# Patient Record
Sex: MALE | Race: WHITE | NOT HISPANIC OR LATINO | Employment: OTHER | URBAN - METROPOLITAN AREA
[De-identification: names, ages, dates, MRNs, and addresses within clinical notes are randomized per-mention and may not be internally consistent; named-entity substitution may affect disease eponyms.]

---

## 2017-01-18 PROBLEM — F41.9 ANXIETY: Status: ACTIVE | Noted: 2017-01-18

## 2019-03-06 PROBLEM — M67.911 ROTATOR CUFF DISORDER, RIGHT: Status: ACTIVE | Noted: 2019-03-06

## 2019-03-06 PROBLEM — M70.21 OLECRANON BURSITIS OF RIGHT ELBOW: Status: ACTIVE | Noted: 2019-03-06

## 2019-09-23 PROBLEM — M25.729 OLECRANON BONE SPUR: Status: ACTIVE | Noted: 2019-09-23

## 2020-03-09 PROBLEM — D21.10: Status: ACTIVE | Noted: 2020-03-09

## 2020-10-19 ENCOUNTER — PATIENT MESSAGE (OUTPATIENT)
Dept: OTHER | Facility: OTHER | Age: 65
End: 2020-10-19

## 2020-12-21 ENCOUNTER — PATIENT MESSAGE (OUTPATIENT)
Dept: OTHER | Facility: OTHER | Age: 65
End: 2020-12-21

## 2021-02-10 PROBLEM — Z78.9 STATIN INTOLERANCE: Status: ACTIVE | Noted: 2021-02-10

## 2022-03-30 ENCOUNTER — OFFICE VISIT (OUTPATIENT)
Dept: NEPHROLOGY | Facility: CLINIC | Age: 67
End: 2022-03-30
Payer: MEDICARE

## 2022-03-30 VITALS
HEART RATE: 62 BPM | HEIGHT: 70 IN | SYSTOLIC BLOOD PRESSURE: 156 MMHG | BODY MASS INDEX: 28.49 KG/M2 | WEIGHT: 199 LBS | DIASTOLIC BLOOD PRESSURE: 80 MMHG | OXYGEN SATURATION: 96 %

## 2022-03-30 DIAGNOSIS — I10 PRIMARY HYPERTENSION: Primary | ICD-10-CM

## 2022-03-30 DIAGNOSIS — D63.1 ANEMIA IN STAGE 3A CHRONIC KIDNEY DISEASE: ICD-10-CM

## 2022-03-30 DIAGNOSIS — N18.31 STAGE 3A CHRONIC KIDNEY DISEASE: ICD-10-CM

## 2022-03-30 DIAGNOSIS — N18.31 ANEMIA IN STAGE 3A CHRONIC KIDNEY DISEASE: ICD-10-CM

## 2022-03-30 PROCEDURE — 99999 PR PBB SHADOW E&M-EST. PATIENT-LVL IV: CPT | Mod: PBBFAC,,, | Performed by: INTERNAL MEDICINE

## 2022-03-30 PROCEDURE — 99999 PR PBB SHADOW E&M-EST. PATIENT-LVL IV: ICD-10-PCS | Mod: PBBFAC,,, | Performed by: INTERNAL MEDICINE

## 2022-03-30 PROCEDURE — 99214 OFFICE O/P EST MOD 30 MIN: CPT | Mod: PBBFAC,PN | Performed by: INTERNAL MEDICINE

## 2022-03-30 PROCEDURE — 99204 PR OFFICE/OUTPT VISIT, NEW, LEVL IV, 45-59 MIN: ICD-10-PCS | Mod: S$PBB,,, | Performed by: INTERNAL MEDICINE

## 2022-03-30 PROCEDURE — 99204 OFFICE O/P NEW MOD 45 MIN: CPT | Mod: S$PBB,,, | Performed by: INTERNAL MEDICINE

## 2022-03-30 RX ORDER — CHOLECALCIFEROL (VITAMIN D3) 25 MCG
1000 TABLET ORAL DAILY
COMMUNITY

## 2022-03-30 RX ORDER — ASCORBIC ACID 125 MG
TABLET,CHEWABLE ORAL
COMMUNITY

## 2022-03-30 RX ORDER — ASCORBIC ACID 500 MG
500 TABLET ORAL DAILY
COMMUNITY
End: 2023-10-09

## 2022-03-30 NOTE — PROGRESS NOTES
Subjective:       Patient ID: Ashwin Thompson is a 67 y.o. White male who presents for initial evaluation of HTN referred by PCP.     Pt states they are here for HTN. On a follow up at this PCP, his BP at that time was in the 190s. He is following with cardiology for HTN. Since initiation of tx his BP had improve. Renal US with Right kidney measures 7.7 x 4.2 x 3.9 cm. Left kidney measures 12.9 x 7.0 x 5.7 cm. and no significant hemodynamic JOSE ROBERTO on right.  Renal function per chart review with sr cr baseline of 1.1 - 1.3 mg/dL and moderately increased     - Pre- DM type 2 diagnosed in 2018 with initial HgbA1c of 5.9%. Diet controlled   - HTN diagnosed about 10 years afgo. Patient reports good adherence to the current regiment, which includes Valsartan 320 mg PO Daily, Terazosin 5 mg PO Daily, Clonidine 0.1 mg, Chlorthalidone 25 mg PO Daily. They are following low salt diet. Previously on atenolol and stopped due to bradycardia. Never hospitalized for uncontrolled HTN or hypotension/syncopal episodes.  - Uric acid stones and nephrolithiasis: on K Citrate PRN (?), no stones on renal US in 2022.  - NSAIDs: chronic diclofenac use for 10 years.    Denies fam Hx of renal disease.    Review of Systems   Constitutional: Negative for chills, fatigue and fever.   HENT: Negative for hearing loss, trouble swallowing and voice change.    Respiratory: Negative for cough, shortness of breath and wheezing.    Cardiovascular: Negative for chest pain, palpitations and leg swelling.   Gastrointestinal: Negative for abdominal distention, abdominal pain, constipation and diarrhea.   Endocrine: Negative for polydipsia, polyphagia and polyuria.   Genitourinary: Negative for dysuria, flank pain, frequency and hematuria.   Musculoskeletal: Negative for arthralgias, back pain and myalgias.   Skin: Negative for rash and wound.   Neurological: Negative for dizziness, syncope, weakness and light-headedness.   Hematological: Negative for  "adenopathy. Does not bruise/bleed easily.       The past medical, family and social histories were reviewed for this encounter.     BP (!) 156/80 (BP Location: Left arm, Patient Position: Sitting)   Pulse 62   Ht 5' 10" (1.778 m)   Wt 90.3 kg (199 lb)   SpO2 96%   BMI 28.55 kg/m²     Objective:      Physical Exam  Vitals reviewed.   Constitutional:       General: He is not in acute distress.     Appearance: He is well-developed.   HENT:      Head: Normocephalic and atraumatic.      Mouth/Throat:      Mouth: Mucous membranes are moist.      Pharynx: Oropharynx is clear.   Eyes:      General: No scleral icterus.     Extraocular Movements: Extraocular movements intact.      Conjunctiva/sclera: Conjunctivae normal.   Neck:      Vascular: No JVD.   Cardiovascular:      Rate and Rhythm: Normal rate and regular rhythm.      Heart sounds: Normal heart sounds. No murmur heard.    No friction rub. No gallop.   Pulmonary:      Effort: Pulmonary effort is normal. No respiratory distress.      Breath sounds: Normal breath sounds. No wheezing.   Abdominal:      General: Bowel sounds are normal. There is no distension.      Palpations: Abdomen is soft.      Tenderness: There is no abdominal tenderness.   Musculoskeletal:         General: No tenderness.      Cervical back: Normal range of motion.      Right lower leg: Edema present.      Left lower leg: Edema present.   Skin:     General: Skin is warm and dry.      Capillary Refill: Capillary refill takes less than 2 seconds.      Findings: No rash.   Neurological:      General: No focal deficit present.      Mental Status: He is alert and oriented to person, place, and time.   Psychiatric:         Mood and Affect: Mood normal.         Behavior: Behavior normal.         Assessment:       1. Primary hypertension    2. Stage 3a chronic kidney disease    3. Anemia in stage 3a chronic kidney disease        Plan:   Return to clinic in 6 months    CKD in a setting of HTN and " NSAIDs  Baseline creatinine is 1.1 - 1.3 mg/dL              - Last known sr cr of 1.1 mg/dL corresponding to CKD stage G3aA2 and UMACR 632   - Euvolemic   - Pt understands importance of blood pressure and glycemic control and is aware that uncontrolled HTN and DM leads to progression of CKD   - Pt will benefit from initiation of SGLT2i for cario-renal protection   - Complete avoidance of NSAIDs   - Low salt diet with < 2000 mg/day   - Dose adjust medications to GFR of 60-46   - Avoid nephrotoxins including IV contrast    Microalbuminuria in a setting of CKD    Hx of sterile pyuria    - Stop NSAIDs for 2 weeks and repeat UA    HTN   - BP uncontrolled: avoid hypotension and dehydration   - Follows up with cardiology   - Atrophic R kidney without JOSE ROBERTO    Pre-DM   - diet controlled     Vift D deficiency   - Pt wants to continue his supplement    - Tube shortage    Chronic pain    - Switch Diclofenac to Tylenol    Med changes: none

## 2022-03-30 NOTE — PATIENT INSTRUCTIONS
DO NOT take any of NSAIDs or pain medications listed below, instead use Tylenol as written on the bottle.   DO NOT take and of the following  ibuprofen.  naproxen.  diclofenac.  celecoxib.  mefenamic acid.  etoricoxib.  indomethacin.  high-dose aspirin (low-dose aspirin is not normally considered to be an NSAID).    Ok to take Tylenol    Low Salt diet < 2000 mg per day

## 2022-04-28 PROBLEM — Z78.9 STATIN INTOLERANCE: Status: RESOLVED | Noted: 2021-02-10 | Resolved: 2022-04-28

## 2022-09-27 ENCOUNTER — PATIENT MESSAGE (OUTPATIENT)
Dept: NEPHROLOGY | Facility: CLINIC | Age: 67
End: 2022-09-27
Payer: MEDICARE

## 2022-10-05 ENCOUNTER — PATIENT MESSAGE (OUTPATIENT)
Dept: NEPHROLOGY | Facility: CLINIC | Age: 67
End: 2022-10-05
Payer: MEDICARE

## 2022-10-06 ENCOUNTER — OFFICE VISIT (OUTPATIENT)
Dept: NEPHROLOGY | Facility: CLINIC | Age: 67
End: 2022-10-06
Payer: MEDICARE

## 2022-10-06 DIAGNOSIS — N18.2 CHRONIC KIDNEY DISEASE, STAGE 2 (MILD): ICD-10-CM

## 2022-10-06 DIAGNOSIS — R80.1 PERSISTENT PROTEINURIA: ICD-10-CM

## 2022-10-06 DIAGNOSIS — I10 HYPERTENSION, UNSPECIFIED TYPE: Primary | ICD-10-CM

## 2022-10-06 DIAGNOSIS — D63.1 ANEMIA IN STAGE 2 CHRONIC KIDNEY DISEASE: ICD-10-CM

## 2022-10-06 DIAGNOSIS — E79.0 ELEVATED URIC ACID IN BLOOD: ICD-10-CM

## 2022-10-06 DIAGNOSIS — N18.2 ANEMIA IN STAGE 2 CHRONIC KIDNEY DISEASE: ICD-10-CM

## 2022-10-06 DIAGNOSIS — R73.03 PRE-DIABETES: ICD-10-CM

## 2022-10-06 PROCEDURE — 99214 PR OFFICE/OUTPT VISIT, EST, LEVL IV, 30-39 MIN: ICD-10-PCS | Mod: 95,,, | Performed by: INTERNAL MEDICINE

## 2022-10-06 PROCEDURE — 99214 OFFICE O/P EST MOD 30 MIN: CPT | Mod: 95,,, | Performed by: INTERNAL MEDICINE

## 2022-10-06 NOTE — PROGRESS NOTES
Subjective:    The patient location is: [place]  The chief complaint leading to consultation is: CKD    Visit type: audiovisual    Face to Face time with patient: 10  20 minutes of total time spent on the encounter, which includes face to face time and non-face to face time preparing to see the patient (eg, review of tests), Obtaining and/or reviewing separately obtained history, Documenting clinical information in the electronic or other health record, Independently interpreting results (not separately reported) and communicating results to the patient/family/caregiver, or Care coordination (not separately reported).         Each patient to whom he or she provides medical services by telemedicine is:  (1) informed of the relationship between the physician and patient and the respective role of any other health care provider with respect to management of the patient; and (2) notified that he or she may decline to receive medical services by telemedicine and may withdraw from such care at any time.    Notes:      Patient ID: Ashwin Thompson is a 67 y.o. White male who presents for follow up on HTN and CKD.     Since last seen at nephrology clinic, Ashwin Thompson denies any new hospitalizations.  No uremic symptoms, not volume overloaded.    Reports taking their medications on time, without missed doses. As to their chronic conditions:  - CKD: Denies use of NSAIDs.   2021: baseline sr cr of 1.3 mg/dL   2022: baseline sr cr of 1.1 - 1.3 mg/dL with moderately increased proteinuria of UPCR 275. Renal US with Right kidney measures 7.7 x 4.2 x 3.9 cm. Left kidney measures 12.9 x 7.0 x 5.7 cm. and no significant hemodynamic JOSE ROBERTO on right.  - Dmt2: pt reports good glycemic control.  - HTN: uncontrolled, follows low salt diet. Per cards Valsartan decreased to 160 mg Daily due to low BP and chlorthalidone was DC-ed. Now back on Chlorthalidone. Denies dizziness/lightheadedness or hypotension/syncopal episodes.  - PVC's  symptomatic with weakness    Review of Systems   Constitutional:  Negative for appetite change, chills and fever.   HENT:  Negative for congestion.    Eyes:  Negative for visual disturbance.   Respiratory:  Negative for cough and shortness of breath.    Cardiovascular:  Positive for palpitations. Negative for chest pain and leg swelling.   Gastrointestinal:  Negative for abdominal pain, diarrhea, nausea and vomiting.   Genitourinary:  Negative for difficulty urinating, dysuria and hematuria.   Musculoskeletal:  Positive for arthralgias. Negative for myalgias.   Skin:  Negative for rash.   Neurological:  Positive for weakness. Negative for headaches.   Psychiatric/Behavioral:  Negative for sleep disturbance.      The past medical, family and social histories were reviewed for this encounter.     There were no vitals taken for this visit.    Objective:      Physical Exam  Vitals reviewed.   Constitutional:       General: He is not in acute distress.     Appearance: He is well-developed.   HENT:      Head: Normocephalic and atraumatic.   Eyes:      General: No scleral icterus.  Pulmonary:      Effort: Pulmonary effort is normal. No respiratory distress.   Neurological:      Mental Status: He is alert and oriented to person, place, and time.   Psychiatric:         Mood and Affect: Mood normal.         Behavior: Behavior normal.       Assessment:       1. Hypertension, unspecified type    2. Persistent proteinuria    3. Elevated uric acid in blood    4. Pre-diabetes    5. Chronic kidney disease, stage 2 (mild)    6. Anemia in stage 2 chronic kidney disease        Plan:   Return to clinic in 12 months    CKD stage G2A2 in a setting of NSAIDs and HTN  Baseline creatinine is 1.1 - 1.3 mg/dL and moderately increased proteinuria  Lab Results   Component Value Date    CREATININE 1.31 09/27/2022      - Euvolemic   - Pt understands importance of blood pressure and glycemic control and is aware that uncontrolled HTN and DM leads  to progression of CKD   - Complete avoidance of NSAIDs   - Low salt diet with < 2000 mg/day   - Dose adjust medications to GFR of > 60   - Avoid nephrotoxins including IV contrast    HTN   - BP controlled: continue current medications, avoid hypotension and dehydration    Proteinuria  - Due to DM     Pre-DM   - Pre-DM without diabetic retinopathy: well controlled with most recent HgbA1c of 5.6%, continue yearly optho checks    PVC's  - Follows with cardiology    Elevated uric acid    - Does not want to start Allopurinol    Anemia   - Stable will repeat on next visit    Med changes: none

## 2023-10-05 ENCOUNTER — TELEPHONE (OUTPATIENT)
Dept: NEPHROLOGY | Facility: CLINIC | Age: 68
End: 2023-10-05
Payer: MEDICARE

## 2023-10-05 NOTE — TELEPHONE ENCOUNTER
----- Message from Lucy Beckett sent at 10/5/2023  4:10 PM CDT -----  Type: Needs Medical Advice  Who Called:  pt     Best Call Back Number: [phone]    Additional Information: pt calling in regards to appt on 10/9 please advise

## 2023-10-09 ENCOUNTER — OFFICE VISIT (OUTPATIENT)
Dept: NEPHROLOGY | Facility: CLINIC | Age: 68
End: 2023-10-09
Payer: MEDICARE

## 2023-10-09 VITALS
OXYGEN SATURATION: 97 % | HEART RATE: 52 BPM | HEIGHT: 70 IN | BODY MASS INDEX: 28.88 KG/M2 | WEIGHT: 201.75 LBS | SYSTOLIC BLOOD PRESSURE: 114 MMHG | DIASTOLIC BLOOD PRESSURE: 56 MMHG

## 2023-10-09 DIAGNOSIS — N17.9 AKI (ACUTE KIDNEY INJURY): ICD-10-CM

## 2023-10-09 DIAGNOSIS — M19.90 OTHER TYPE OF OSTEOARTHRITIS, UNSPECIFIED SITE: ICD-10-CM

## 2023-10-09 DIAGNOSIS — E55.9 VITAMIN D DEFICIENCY, UNSPECIFIED: ICD-10-CM

## 2023-10-09 DIAGNOSIS — R80.1 PERSISTENT PROTEINURIA: ICD-10-CM

## 2023-10-09 DIAGNOSIS — I10 HYPERTENSION, UNSPECIFIED TYPE: Primary | ICD-10-CM

## 2023-10-09 DIAGNOSIS — N18.2 CHRONIC KIDNEY DISEASE, STAGE 2 (MILD): ICD-10-CM

## 2023-10-09 DIAGNOSIS — E79.0 ELEVATED URIC ACID IN BLOOD: ICD-10-CM

## 2023-10-09 PROCEDURE — 99214 PR OFFICE/OUTPT VISIT, EST, LEVL IV, 30-39 MIN: ICD-10-PCS | Mod: S$PBB,,, | Performed by: INTERNAL MEDICINE

## 2023-10-09 PROCEDURE — 99214 OFFICE O/P EST MOD 30 MIN: CPT | Mod: S$PBB,,, | Performed by: INTERNAL MEDICINE

## 2023-10-09 PROCEDURE — 99999 PR PBB SHADOW E&M-EST. PATIENT-LVL III: ICD-10-PCS | Mod: PBBFAC,,, | Performed by: INTERNAL MEDICINE

## 2023-10-09 PROCEDURE — 99999 PR PBB SHADOW E&M-EST. PATIENT-LVL III: CPT | Mod: PBBFAC,,, | Performed by: INTERNAL MEDICINE

## 2023-10-09 PROCEDURE — 99213 OFFICE O/P EST LOW 20 MIN: CPT | Mod: PBBFAC,PN | Performed by: INTERNAL MEDICINE

## 2023-10-09 RX ORDER — BEMPEDOIC ACID 180 MG/1
TABLET, FILM COATED ORAL
COMMUNITY
Start: 2023-04-20

## 2023-10-09 RX ORDER — NIFEDIPINE 60 MG/1
TABLET, EXTENDED RELEASE ORAL
COMMUNITY
Start: 2023-09-01 | End: 2023-10-09 | Stop reason: SDUPTHER

## 2023-10-09 RX ORDER — OLMESARTAN MEDOXOMIL 40 MG/1
1 TABLET ORAL DAILY
COMMUNITY
Start: 2023-08-03 | End: 2023-11-17 | Stop reason: SDUPTHER

## 2023-10-09 RX ORDER — NIFEDIPINE 30 MG/1
30 TABLET, FILM COATED, EXTENDED RELEASE ORAL DAILY
Qty: 30 TABLET | Refills: 11 | Status: SHIPPED | OUTPATIENT
Start: 2023-10-09 | End: 2023-10-09

## 2023-10-09 RX ORDER — NIFEDIPINE 30 MG/1
30 TABLET, FILM COATED, EXTENDED RELEASE ORAL DAILY
Qty: 90 TABLET | Refills: 3 | Status: SHIPPED | OUTPATIENT
Start: 2023-10-09 | End: 2023-10-10 | Stop reason: SDUPTHER

## 2023-10-09 RX ORDER — SPIRONOLACTONE 25 MG/1
50 TABLET ORAL DAILY
COMMUNITY
Start: 2023-04-20 | End: 2023-11-17 | Stop reason: ALTCHOICE

## 2023-10-09 NOTE — PROGRESS NOTES
Subjective:    Patient ID: Ashwin Thompson is a 68 y.o. White male who presents for follow up on HTN and CKD.     Since last seen at nephrology clinic, Ashwin Thompson denies any new hospitalizations. Starter Nexletol in April. Angiogram done on August 22nd, 2023 without JOSE ROBERTO. Now on spironolactone. Switched from Valsartan to Olmesartan on August 3rd and now with increased sr cr up to 1.9 mg/dL.  No uremic symptoms, not volume overloaded.    Reports taking their medications on time, without missed doses. As to their chronic conditions:  - CKD: Denies use of NSAIDs.   2021: baseline sr cr of 1.3 mg/dL   2022: baseline sr cr of 1.1 - 1.3 mg/dL with moderately increased proteinuria of UPCR 275. Renal US with Right kidney measures 7.7 x 4.2 x 3.9 cm. Left kidney measures 12.9 x 7.0 x 5.7 cm. and no significant hemodynamic JOSE ROBERTO on right.   2023: baseline sr cr of 1.5 - 1.9 mg/dL with moderately increased proteinuria of UPCR 244  - HTN: controlled, follows low salt diet. Denies dizziness/lightheadedness or hypotension/syncopal episodes.  - PVC's symptomatic with weakness  - High uric acid torie setting of Nexletol and did not tolerate urate-lowering drugs    Review of Systems   Constitutional:  Negative for appetite change, chills and fever.   HENT:  Negative for congestion.    Eyes:  Negative for visual disturbance.   Respiratory:  Negative for cough and shortness of breath.    Cardiovascular:  Positive for palpitations. Negative for chest pain and leg swelling.   Gastrointestinal:  Negative for abdominal pain, diarrhea, nausea and vomiting.   Genitourinary:  Negative for difficulty urinating, dysuria and hematuria.   Musculoskeletal:  Positive for arthralgias. Negative for myalgias.   Skin:  Negative for rash.   Neurological:  Negative for weakness and headaches.   Psychiatric/Behavioral:  Negative for sleep disturbance.        The past medical, family and social histories were reviewed for this encounter.     BP  "(!) 114/56 (BP Location: Left arm, Patient Position: Sitting, BP Method: Medium (Manual))   Pulse (!) 52   Ht 5' 10" (1.778 m)   Wt 91.5 kg (201 lb 11.5 oz)   SpO2 97%   BMI 28.94 kg/m²     Objective:      Physical Exam  Vitals reviewed.   Constitutional:       General: He is not in acute distress.     Appearance: He is well-developed.   HENT:      Head: Normocephalic and atraumatic.   Eyes:      General: No scleral icterus.  Pulmonary:      Effort: Pulmonary effort is normal. No respiratory distress.   Neurological:      Mental Status: He is alert and oriented to person, place, and time.   Psychiatric:         Mood and Affect: Mood normal.         Behavior: Behavior normal.         Assessment:       1. Hypertension, unspecified type    2. Persistent proteinuria    3. Chronic kidney disease, stage 2 (mild)    4. JANUSZ (acute kidney injury)    5. Other type of osteoarthritis, unspecified site    6. Elevated uric acid in blood    7. Vitamin D deficiency, unspecified        Plan:   Return to clinic in 1 months    CKD stage G2A2 in a setting of NSAIDs and HTN  Baseline creatinine is 1.1 - 1.3 mg/dL and moderately increased proteinuria and now with increased sr cr up to 1.9 mg/dL  Lab Results   Component Value Date    CREATININE 1.86 (H) 10/04/2023      - Euvolemic   - Pt understands importance of blood pressure and glycemic control and is aware that uncontrolled HTN and DM leads to progression of CKD   - Complete avoidance of NSAIDs   - Low salt diet with < 2000 mg/day   - Dose adjust medications to GFR of 30-45   - Avoid nephrotoxins including IV contrast    HTN   - BP controlled: continue current medications, avoid hypotension and dehydration    Proteinuria  - Due to DM     Pre-DM   - Pre-DM without diabetic retinopathy: well controlled with most recent HgbA1c of 5.6%, continue yearly optho checks    PVC's  - Follows with cardiology    Elevated uric acid    - Does not want to start Allopurinol    Anemia   - Stable " will repeat on next visit    Med changes:   Decrease Nifedipine to 30 mg daily

## 2023-10-10 ENCOUNTER — PATIENT MESSAGE (OUTPATIENT)
Dept: NEPHROLOGY | Facility: CLINIC | Age: 68
End: 2023-10-10
Payer: MEDICARE

## 2023-10-10 RX ORDER — NIFEDIPINE 30 MG/1
30 TABLET, FILM COATED, EXTENDED RELEASE ORAL DAILY
Qty: 90 TABLET | Refills: 3 | Status: SHIPPED | OUTPATIENT
Start: 2023-10-10 | End: 2023-11-17 | Stop reason: SDUPTHER

## 2023-10-24 ENCOUNTER — PATIENT MESSAGE (OUTPATIENT)
Dept: NEPHROLOGY | Facility: CLINIC | Age: 68
End: 2023-10-24
Payer: MEDICARE

## 2023-11-02 ENCOUNTER — PATIENT MESSAGE (OUTPATIENT)
Dept: NEPHROLOGY | Facility: CLINIC | Age: 68
End: 2023-11-02
Payer: MEDICARE

## 2023-11-02 DIAGNOSIS — N21.8 CALCULUS OF OTHER LOWER URINARY TRACT LOCATION: Primary | ICD-10-CM

## 2023-11-17 ENCOUNTER — OFFICE VISIT (OUTPATIENT)
Dept: NEPHROLOGY | Facility: CLINIC | Age: 68
End: 2023-11-17
Payer: MEDICARE

## 2023-11-17 ENCOUNTER — TELEPHONE (OUTPATIENT)
Dept: NEPHROLOGY | Facility: CLINIC | Age: 68
End: 2023-11-17

## 2023-11-17 VITALS — SYSTOLIC BLOOD PRESSURE: 124 MMHG | HEART RATE: 54 BPM | OXYGEN SATURATION: 95 % | DIASTOLIC BLOOD PRESSURE: 66 MMHG

## 2023-11-17 DIAGNOSIS — E78.2 MIXED HYPERLIPIDEMIA: ICD-10-CM

## 2023-11-17 DIAGNOSIS — I10 PRIMARY HYPERTENSION: Primary | ICD-10-CM

## 2023-11-17 DIAGNOSIS — N18.2 CHRONIC KIDNEY DISEASE, STAGE 2 (MILD): ICD-10-CM

## 2023-11-17 DIAGNOSIS — E79.0 ELEVATED URIC ACID IN BLOOD: ICD-10-CM

## 2023-11-17 DIAGNOSIS — R80.1 PERSISTENT PROTEINURIA: ICD-10-CM

## 2023-11-17 DIAGNOSIS — R73.03 PRE-DIABETES: ICD-10-CM

## 2023-11-17 PROCEDURE — 99999 PR PBB SHADOW E&M-EST. PATIENT-LVL III: ICD-10-PCS | Mod: PBBFAC,,, | Performed by: INTERNAL MEDICINE

## 2023-11-17 PROCEDURE — 99214 PR OFFICE/OUTPT VISIT, EST, LEVL IV, 30-39 MIN: ICD-10-PCS | Mod: S$PBB,,, | Performed by: INTERNAL MEDICINE

## 2023-11-17 PROCEDURE — 99213 OFFICE O/P EST LOW 20 MIN: CPT | Mod: PBBFAC,PN | Performed by: INTERNAL MEDICINE

## 2023-11-17 PROCEDURE — 99214 OFFICE O/P EST MOD 30 MIN: CPT | Mod: S$PBB,,, | Performed by: INTERNAL MEDICINE

## 2023-11-17 PROCEDURE — 99999 PR PBB SHADOW E&M-EST. PATIENT-LVL III: CPT | Mod: PBBFAC,,, | Performed by: INTERNAL MEDICINE

## 2023-11-17 RX ORDER — OLMESARTAN MEDOXOMIL 40 MG/1
40 TABLET ORAL DAILY
Qty: 90 TABLET | Refills: 3 | Status: SHIPPED | OUTPATIENT
Start: 2023-11-17 | End: 2024-11-16

## 2023-11-17 RX ORDER — POTASSIUM CITRATE 5 MEQ/1
10 TABLET, EXTENDED RELEASE ORAL
Qty: 180 TABLET | Refills: 11 | Status: SHIPPED | OUTPATIENT
Start: 2023-11-17 | End: 2024-11-16

## 2023-11-17 RX ORDER — ALLOPURINOL 100 MG/1
100 TABLET ORAL DAILY
Qty: 30 TABLET | Refills: 0 | Status: SHIPPED | OUTPATIENT
Start: 2023-11-17 | End: 2023-12-17

## 2023-11-17 RX ORDER — HYDROCHLOROTHIAZIDE 25 MG/1
25 TABLET ORAL DAILY
Qty: 30 TABLET | Refills: 11 | Status: SHIPPED | OUTPATIENT
Start: 2023-11-17 | End: 2023-11-17 | Stop reason: SDUPTHER

## 2023-11-17 RX ORDER — NIFEDIPINE 30 MG/1
30 TABLET, FILM COATED, EXTENDED RELEASE ORAL DAILY
Qty: 90 TABLET | Refills: 3 | Status: SHIPPED | OUTPATIENT
Start: 2023-11-17 | End: 2023-11-20 | Stop reason: SDUPTHER

## 2023-11-17 RX ORDER — ALLOPURINOL 100 MG/1
100 TABLET ORAL DAILY
Qty: 30 TABLET | Refills: 0 | Status: SHIPPED | OUTPATIENT
Start: 2023-11-17 | End: 2023-11-17 | Stop reason: SDUPTHER

## 2023-11-17 RX ORDER — HYDROCHLOROTHIAZIDE 25 MG/1
25 TABLET ORAL DAILY
Qty: 30 TABLET | Refills: 11 | Status: SHIPPED | OUTPATIENT
Start: 2023-11-17 | End: 2023-11-20 | Stop reason: SDUPTHER

## 2023-11-17 RX ORDER — POTASSIUM CITRATE 5 MEQ/1
10 TABLET, EXTENDED RELEASE ORAL
Qty: 180 TABLET | Refills: 11 | Status: SHIPPED | OUTPATIENT
Start: 2023-11-17 | End: 2023-11-17 | Stop reason: SDUPTHER

## 2023-11-17 NOTE — PROGRESS NOTES
Subjective:    Patient ID: Ashwin Thompson is a 68 y.o. White male who presents for follow up on HTN and CKD.     Since last seen at nephrology clinic, Ashwin Thompson denies any new hospitalizations.   No uremic symptoms, not volume overloaded.    Reports taking their medications on time, without missed doses. As to their chronic conditions:  - CKD: Denies use of NSAIDs.   2021: baseline sr cr of 1.3 mg/dL   2022: baseline sr cr of 1.1 - 1.3 mg/dL with moderately increased proteinuria of UPCR 275. Renal US with Right kidney measures 7.7 x 4.2 x 3.9 cm. Left kidney measures 12.9 x 7.0 x 5.7 cm. and no significant hemodynamic JOSE ROBERTO on right.   2023: baseline sr cr of 1.5 - 1.9 mg/dL with moderately increased proteinuria of UPCR 244  - HTN: uncontrolled, follows low salt diet. Denies dizziness/lightheadedness or hypotension/syncopal episodes.  - PVC's symptomatic with weakness  - High uric acid in a setting of Nexletol and did not tolerate urate-lowering drugs- now stopped Nexletol    Review of Systems   Constitutional:  Negative for appetite change, chills and fever.   HENT:  Negative for congestion.    Eyes:  Negative for visual disturbance.   Respiratory:  Negative for cough and shortness of breath.    Cardiovascular:  Positive for leg swelling. Negative for chest pain and palpitations.   Gastrointestinal:  Negative for abdominal pain, diarrhea, nausea and vomiting.   Genitourinary:  Negative for difficulty urinating, dysuria and hematuria.   Musculoskeletal:  Negative for arthralgias and myalgias.   Skin:  Negative for rash.   Neurological:  Negative for weakness and headaches.   Psychiatric/Behavioral:  Negative for sleep disturbance.        The past medical, family and social histories were reviewed for this encounter.     /66 (BP Location: Right arm, Patient Position: Sitting, BP Method: Large (Manual))   Pulse (!) 54   SpO2 95%     Objective:      Physical Exam  Vitals reviewed.    Constitutional:       General: He is not in acute distress.     Appearance: Normal appearance. He is well-developed. He is not ill-appearing.   HENT:      Head: Normocephalic and atraumatic.      Mouth/Throat:      Mouth: Mucous membranes are moist.      Pharynx: Oropharynx is clear.   Eyes:      General: No scleral icterus.     Extraocular Movements: Extraocular movements intact.      Conjunctiva/sclera: Conjunctivae normal.   Cardiovascular:      Rate and Rhythm: Normal rate and regular rhythm.      Pulses: Normal pulses.      Heart sounds: No murmur heard.  Pulmonary:      Effort: Pulmonary effort is normal. No respiratory distress.   Abdominal:      General: Abdomen is flat. Bowel sounds are normal. There is no distension.      Palpations: Abdomen is soft.   Musculoskeletal:         General: No swelling or tenderness.   Skin:     General: Skin is warm and dry.      Capillary Refill: Capillary refill takes less than 2 seconds.      Coloration: Skin is not pale.   Neurological:      General: No focal deficit present.      Mental Status: He is alert and oriented to person, place, and time.   Psychiatric:         Mood and Affect: Mood normal.         Behavior: Behavior normal.         Assessment:       1. Primary hypertension    2. Persistent proteinuria    3. Chronic kidney disease, stage 2 (mild)    4. Pre-diabetes    5. Elevated uric acid in blood    6. Mixed hyperlipidemia        Plan:   Return to clinic in 1 months    CKD stage G2A2 in a setting of NSAIDs and HTN  Baseline creatinine is 1.1 - 1.3 mg/dL and moderately increased proteinuria and now with increased sr cr up to 1.9 mg/dL  Lab Results   Component Value Date    CREATININE 1.35 11/14/2023      - Euvolemic   - Pt understands importance of blood pressure and glycemic control and is aware that uncontrolled HTN and DM leads to progression of CKD   - Complete avoidance of NSAIDs   - Low salt diet with < 2000 mg/day   - Dose adjust medications to GFR of  30-45   - Avoid nephrotoxins including IV contrast    HTN   - BP uncontrolled: continue current medications, avoid hypotension and dehydration   - Take nifedipine at night    - Stop Spironolactone   - Start HCTZ 25 mg Daily    Proteinuria  - Due to DM     Pre-DM   - Pre-DM without diabetic retinopathy: well controlled with most recent HgbA1c of 5.6%, continue yearly optho checks    PVC's  - Follows with cardiology    Elevated uric acid/Stones   - Restarting Allopurinol 100 mg if pt can tolerate then increase to 300 mg daily   - K-Citrate 10 meq PO TID    HLD   - Stop Nexletol    - Consider Repatha    Anemia   - Stable will repeat on next visit    Med changes:    - Take nifedipine at night    - Restarting Allopurinol 100 mg if pt can tolerate then increase to 300 mg daily   - Start K-Citrate 10 meq PO TID  4.        - Stop Spironolactone and start hydrochlorothiazide 25 mg daily

## 2023-11-20 ENCOUNTER — PATIENT MESSAGE (OUTPATIENT)
Dept: NEPHROLOGY | Facility: CLINIC | Age: 68
End: 2023-11-20
Payer: MEDICARE

## 2023-11-20 RX ORDER — HYDROCHLOROTHIAZIDE 25 MG/1
25 TABLET ORAL DAILY
Qty: 30 TABLET | Refills: 11 | Status: SHIPPED | OUTPATIENT
Start: 2023-11-20 | End: 2024-11-19

## 2023-11-20 RX ORDER — NIFEDIPINE 30 MG/1
30 TABLET, FILM COATED, EXTENDED RELEASE ORAL DAILY
Qty: 90 TABLET | Refills: 3 | Status: SHIPPED | OUTPATIENT
Start: 2023-11-20 | End: 2024-11-19

## 2023-11-28 ENCOUNTER — TELEPHONE (OUTPATIENT)
Dept: NEPHROLOGY | Facility: CLINIC | Age: 68
End: 2023-11-28
Payer: MEDICARE

## 2023-11-28 DIAGNOSIS — R80.1 PERSISTENT PROTEINURIA: ICD-10-CM

## 2023-11-28 DIAGNOSIS — N18.31 ANEMIA IN STAGE 3A CHRONIC KIDNEY DISEASE: ICD-10-CM

## 2023-11-28 DIAGNOSIS — I10 PRIMARY HYPERTENSION: Primary | ICD-10-CM

## 2023-11-28 DIAGNOSIS — D63.1 ANEMIA IN STAGE 3A CHRONIC KIDNEY DISEASE: ICD-10-CM

## 2023-12-12 ENCOUNTER — HOSPITAL ENCOUNTER (OUTPATIENT)
Dept: RADIOLOGY | Facility: HOSPITAL | Age: 68
Discharge: HOME | End: 2023-12-12
Attending: PODIATRIST
Payer: MEDICARE

## 2023-12-12 ENCOUNTER — OFFICE VISIT (OUTPATIENT)
Dept: PODIATRY | Facility: CLINIC | Age: 68
End: 2023-12-12
Payer: MEDICARE

## 2023-12-12 VITALS — WEIGHT: 201.75 LBS | BODY MASS INDEX: 28.88 KG/M2 | HEIGHT: 70 IN

## 2023-12-12 DIAGNOSIS — M21.621 TAILOR'S BUNION OF RIGHT FOOT: ICD-10-CM

## 2023-12-12 DIAGNOSIS — M20.11 HALLUX VALGUS OF RIGHT FOOT: ICD-10-CM

## 2023-12-12 DIAGNOSIS — M79.671 FOOT PAIN, RIGHT: Primary | ICD-10-CM

## 2023-12-12 DIAGNOSIS — M79.671 FOOT PAIN, RIGHT: ICD-10-CM

## 2023-12-12 PROCEDURE — 99999 PR PBB SHADOW E&M-EST. PATIENT-LVL III: CPT | Mod: PBBFAC,,, | Performed by: PODIATRIST

## 2023-12-12 PROCEDURE — 99999 PR PBB SHADOW E&M-EST. PATIENT-LVL III: ICD-10-PCS | Mod: PBBFAC,,, | Performed by: PODIATRIST

## 2023-12-12 PROCEDURE — 99204 OFFICE O/P NEW MOD 45 MIN: CPT | Mod: S$PBB,,, | Performed by: PODIATRIST

## 2023-12-12 PROCEDURE — 99213 OFFICE O/P EST LOW 20 MIN: CPT | Mod: PBBFAC,PO | Performed by: PODIATRIST

## 2023-12-12 PROCEDURE — 73630 X-RAY EXAM OF FOOT: CPT | Mod: TC,PO,RT

## 2023-12-12 PROCEDURE — 73630 X-RAY EXAM OF FOOT: CPT | Mod: 26,RT,, | Performed by: RADIOLOGY

## 2023-12-12 PROCEDURE — 99204 PR OFFICE/OUTPT VISIT, NEW, LEVL IV, 45-59 MIN: ICD-10-PCS | Mod: S$PBB,,, | Performed by: PODIATRIST

## 2023-12-12 PROCEDURE — 73630 XR FOOT COMPLETE 3 VIEW RIGHT: ICD-10-PCS | Mod: 26,RT,, | Performed by: RADIOLOGY

## 2023-12-15 NOTE — PROGRESS NOTES
Subjective:     Patient ID: Ashwin Thompson is a 68 y.o. male.    Chief Complaint: Foot Pain (Right foot hammertoe)    Ashwin is a 68 y.o. male with a past medical history of Arthritis, Hyperlipidemia, Hypertension, Hypothyroid, Kidney stone, and Thyroid disease. The patient's chief complaint consists of painful hammertoes and bunion of the Rt. Foot.  Describes deep aching pain in the foot with all weight bearing.  Rates pain currently as a 6/10.  Symptoms are partially alleviated with rest.  Denies sustaining trauma to the limb. Attempts to wear supportive shoe gear with no improvement in symptoms.  Inquires as to how this issue can be resolved.  Denies any additional pedal complaints.          Hemoglobin A1C   Date Value Ref Range Status   03/23/2022 5.6 0.0 - 5.6 % Final     Comment:     Reference Interval:  5.0 - 5.6 Normal   5.7 - 6.4 High Risk   > 6.5 Diabetic      Hgb A1c results are standardized based on the ([employer]) National   Glycohemoglobin Standardization Program.      Hemoglobin A1C levels are related to mean serum/plasma glucose   during the preceding 2-3 months.        08/12/2021 5.9 (H) 0.0 - 5.6 % Final     Comment:     Reference Interval:  5.0 - 5.6 Normal   5.7 - 6.4 High Risk   > 6.5 Diabetic      Hgb A1c results are standardized based on the ([employer]) National   Glycohemoglobin Standardization Program.      Hemoglobin A1C levels are related to mean serum/plasma glucose   during the preceding 2-3 months.        08/13/2020 6.0 (H) 0.0 - 5.6 % Final     Comment:     Reference Interval:  5.0 - 5.6 Normal   5.7 - 6.4 High Risk   > 6.5 Diabetic    Hgb A1c results are standardized based on the ([employer]) National   Glycohemoglobin Standardization Program.    Hemoglobin A1C levels are related to mean serum/plasma glucose   during the preceding 2-3 months.            Review of Systems   Constitutional: Negative for chills and fever.   Cardiovascular:  Negative for claudication.   Skin:  Negative for color  change and nail changes.   Musculoskeletal:  Negative for joint swelling, muscle cramps and muscle weakness.   Gastrointestinal:  Negative for nausea and vomiting.   Neurological:  Negative for numbness and paresthesias.   Psychiatric/Behavioral:  Negative for altered mental status.         Objective:     Physical Exam  Constitutional:       Appearance: Normal appearance. He is not ill-appearing.   Cardiovascular:      Pulses:           Dorsalis pedis pulses are 2+ on the right side and 2+ on the left side.        Posterior tibial pulses are 2+ on the right side and 2+ on the left side.      Comments: CFT is < 3 seconds bilateral.  Pedal hair growth is present bilateral.  Toes are warm to touch bilateral.    Musculoskeletal:         General: Tenderness and deformity present. No signs of injury.      Right lower leg: No edema.      Left lower leg: No edema.      Comments: Muscle strength 5/5 in all muscle groups bilateral.  No tenderness nor crepitation with ROM of foot/ankle joints bilateral.  Pain with palpation to the bursa overlying the Rt. Bunion and tailor bunion deformities.  Pain with palpation to met heads 2-5 of the Rt. Foot.  (-) Lachman sign noted.  Semi-rigid hammertoe deformities to digits 2-5 of the Rt. Foot.    Skin:     General: Skin is warm and dry.      Capillary Refill: Capillary refill takes 2 to 3 seconds.      Findings: No bruising, ecchymosis, erythema, signs of injury, laceration, lesion, petechiae or rash.      Comments: Pedal skin has normal turgor, temperature, and texture bilateral.  Toenails x 10 appear normotrophic. Examination of the skin reveals no evidence of significant maceration, rashes, open lesions, suspicious appearing nevi or other concerning lesions.       Neurological:      Mental Status: He is alert.      Sensory: No sensory deficit.      Motor: No weakness or atrophy.      Comments: Light touch is intact bilateral.             Assessment:      Encounter Diagnoses   Name  Primary?    Foot pain, right Yes    Hallux valgus of right foot     Tailor's bunion of right foot      Plan:     Ashwin was seen today for foot pain.    Diagnoses and all orders for this visit:    Foot pain, right  -     X-Ray Foot Complete Right; Future    Hallux valgus of right foot  -     X-Ray Foot Complete Right; Future    Tailor's bunion of right foot  -     X-Ray Foot Complete Right; Future      I counseled the patient on his conditions, their implications and medical management.    Orders written for an x-ray of the Rt. Foot.  Personally evaluated plain films which were positive for a bunion, tailor's bunion, and hammertoes.    Briefly discussed conservative treatment for this issue including: contrast therapy, applying voltaren, use of shoes with a wider toe box, and possibly future steroid injections.    Explained that surgical correction of the aforementioned issues is curative.  Patient is amenable to said plan.    Patient referred to my partner, Dr. Gill, for a surgical consultation.      RTC prn.    Mariusz Cha DPM

## 2024-01-02 ENCOUNTER — TELEPHONE (OUTPATIENT)
Dept: PODIATRY | Facility: CLINIC | Age: 69
End: 2024-01-02
Payer: MEDICARE

## 2024-01-02 NOTE — TELEPHONE ENCOUNTER
----- Message from Katrin Dumas, Patient Care Assistant sent at 1/2/2024  8:53 AM CST -----  Contact: self  Pt is calling to  if he can be seen today. He is already at the location. He is having severe pain in the L foot. Please call back to advise [phone]  Thanks

## 2024-01-04 ENCOUNTER — TELEPHONE (OUTPATIENT)
Dept: NEPHROLOGY | Facility: CLINIC | Age: 69
End: 2024-01-04

## 2024-01-04 ENCOUNTER — OFFICE VISIT (OUTPATIENT)
Dept: NEPHROLOGY | Facility: CLINIC | Age: 69
End: 2024-01-04
Payer: MEDICARE

## 2024-01-04 DIAGNOSIS — M10.00 IDIOPATHIC GOUT, UNSPECIFIED CHRONICITY, UNSPECIFIED SITE: ICD-10-CM

## 2024-01-04 DIAGNOSIS — R80.9 PROTEINURIA, UNSPECIFIED TYPE: ICD-10-CM

## 2024-01-04 DIAGNOSIS — R73.03 PRE-DIABETES: ICD-10-CM

## 2024-01-04 DIAGNOSIS — N18.30 STAGE 3 CHRONIC KIDNEY DISEASE, UNSPECIFIED WHETHER STAGE 3A OR 3B CKD: Primary | ICD-10-CM

## 2024-01-04 PROCEDURE — 99214 OFFICE O/P EST MOD 30 MIN: CPT | Mod: 95,,, | Performed by: INTERNAL MEDICINE

## 2024-01-04 RX ORDER — ALLOPURINOL 100 MG/1
100 TABLET ORAL DAILY
Qty: 90 TABLET | Refills: 0 | Status: SHIPPED | OUTPATIENT
Start: 2024-01-04 | End: 2024-04-03

## 2024-01-04 NOTE — PROGRESS NOTES
Subjective:    This is a virtual visit     Patient location: [place]     Patient ID: Ashwin Thompson is a 68 y.o. White male  for CKD.     Had a gout attack yesterday of left big toe. Saw his primary care doctor and was given medrol which he hasn't started yet. Did take some colchicine and helped with pain. He will start taking medrol today     Reports taking their medications on time, without missed doses. As to their chronic conditions:  CKD: no use of NSAIDs.  2021: baseline sr cr of 1.3 mg/dL  2022: baseline sr cr of 1.1 - 1.3 mg/dL with moderately increased proteinuria of UPCR 275. Renal US with Right kidney measures 7.7 x 4.2 x 3.9 cm. Left kidney measures 12.9 x 7.0 x 5.7 cm. and no significant hemodynamic JOSE ROBERTO on right.  2023: baseline sr cr of 1.5 - 1.9 mg/dL with moderately increased proteinuria of UPCR 244  HTN:controlled mostly 130s at home ,follows low salt diet. Denies dizziness/lightheadedness or hypotension/syncopal episodes.  High uric acid in a setting of Nexletol and did not tolerate urate-lowering drugs- now stopped Nexletol    Review of Systems   Constitutional:  Negative for appetite change, chills and fever.   HENT:  Negative for congestion.    Eyes:  Negative for visual disturbance.   Respiratory:  Negative for cough and shortness of breath.    Cardiovascular:  Negative for chest pain and palpitations.   Gastrointestinal:  Negative for abdominal pain, diarrhea, nausea and vomiting.   Genitourinary:  Negative for difficulty urinating, dysuria and hematuria.   Musculoskeletal:         Left toe pain    Skin:  Negative for rash.   Neurological:  Negative for weakness and headaches.   Psychiatric/Behavioral:  Negative for sleep disturbance.        The past medical, family and social histories were reviewed for this encounter.     There were no vitals taken for this visit.      Assessment:       1. Stage 3 chronic kidney disease, unspecified whether stage 3a or 3b CKD    2. Idiopathic gout,  unspecified chronicity, unspecified site        Plan:   CKD stage 3 in a setting of NSAIDs and HTN  Baseline cr 1.4-1.6   Lab Results   Component Value Date    CREATININE 1.49 (H) 12/20/2023     Pt understands importance of blood pressure and glycemic control and is aware that uncontrolled HTN and DM leads to progression of CKD  Complete avoidance of NSAIDs  Low salt diet with < 2000 mg/day  Dose adjust medications to GFR of 30-45  Avoid nephrotoxins including IV contrast  Mild proteinuria UPCR 196 mg improved from previous on benicar     HTN  BP controlled: continue current medications, avoid hypotension and dehydration  Procardia 30 mg qday   on HCTZ 25 mg Daily  C/w benicar     Pre-DM  Pre-DM without diabetic retinopathy: well controlled with most recent HgbA1c of 5.6%,       Elevated uric acid/gout/Stones  Gout attack left big toe on 1/3 being managed by PCP with medrol   Non obstructing stone 17mm on CT scan chest   Following with urology outside [facility].   C/w  Allopurinol 100 mg if pt can tolerate then increase to 300 mg daily  C/w K-Citrate 10 meq PO TID  Rheumatology referral ordered   Encouraged water intake and low salt diet     Follow up in 3 months

## 2024-01-08 PROBLEM — N17.9 AKI (ACUTE KIDNEY INJURY): Status: RESOLVED | Noted: 2023-10-09 | Resolved: 2024-01-08

## 2024-01-17 ENCOUNTER — OFFICE VISIT (OUTPATIENT)
Dept: PODIATRY | Facility: CLINIC | Age: 69
End: 2024-01-17
Payer: MEDICARE

## 2024-01-17 VITALS — HEIGHT: 70 IN | BODY MASS INDEX: 28.77 KG/M2 | WEIGHT: 201 LBS

## 2024-01-17 DIAGNOSIS — Q66.221 METATARSUS ADDUCTUS OF BOTH FEET: ICD-10-CM

## 2024-01-17 DIAGNOSIS — M19.079 ARTHRITIS OF MIDFOOT: ICD-10-CM

## 2024-01-17 DIAGNOSIS — M20.11 HALLUX VALGUS OF RIGHT FOOT: Primary | ICD-10-CM

## 2024-01-17 DIAGNOSIS — Q66.222 METATARSUS ADDUCTUS OF BOTH FEET: ICD-10-CM

## 2024-01-17 DIAGNOSIS — M21.621 TAILOR'S BUNION OF RIGHT FOOT: ICD-10-CM

## 2024-01-17 PROCEDURE — 99213 OFFICE O/P EST LOW 20 MIN: CPT | Mod: PBBFAC,PO | Performed by: PODIATRIST

## 2024-01-17 PROCEDURE — 99214 OFFICE O/P EST MOD 30 MIN: CPT | Mod: S$PBB,,, | Performed by: PODIATRIST

## 2024-01-17 PROCEDURE — 99999 PR PBB SHADOW E&M-EST. PATIENT-LVL III: CPT | Mod: PBBFAC,,, | Performed by: PODIATRIST

## 2024-01-17 RX ORDER — METHYLPREDNISOLONE 4 MG/1
TABLET ORAL
COMMUNITY
Start: 2024-01-03 | End: 2024-03-12

## 2024-01-17 RX ORDER — COLCHICINE 0.6 MG/1
TABLET ORAL
COMMUNITY
Start: 2024-01-03

## 2024-01-17 NOTE — PROGRESS NOTES
Subjective:     Patient ID: Ashwin Thompson is a 68 y.o. male.    Chief Complaint: Foot Pain (R>L)    Ashwin is a 68 y.o. male with a past medical history of Arthritis, Hyperlipidemia, Hypertension, Hypothyroid, Kidney stone, and Thyroid disease. The patient's chief complaint consists of painful hammertoes and bunion of the Rt. Foot.  Describes deep aching pain in the foot with all weight bearing.  Rates pain currently as a 6/10.  Symptoms are partially alleviated with rest.  Denies sustaining trauma to the limb. Attempts to wear supportive shoe gear with no improvement in symptoms.  Inquires as to how this issue can be resolved.  Denies any additional pedal complaints.      1/17/24: patient was seen as a referral from Dr. Cha for right foot pain needing possible surgical intervention for his bunion and midfoot pain. Since then the patient has started having left foot severe pain and swelling around the [place] holidays. He was seen by PCP and diagnosed with a gout flare it has calmed with colchicine but still has an occasional pain that starts up.         Hemoglobin A1C   Date Value Ref Range Status   03/23/2022 5.6 0.0 - 5.6 % Final     Comment:     Reference Interval:  5.0 - 5.6 Normal   5.7 - 6.4 High Risk   > 6.5 Diabetic      Hgb A1c results are standardized based on the (NGSP) National   Glycohemoglobin Standardization Program.      Hemoglobin A1C levels are related to mean serum/plasma glucose   during the preceding 2-3 months.        08/12/2021 5.9 (H) 0.0 - 5.6 % Final     Comment:     Reference Interval:  5.0 - 5.6 Normal   5.7 - 6.4 High Risk   > 6.5 Diabetic      Hgb A1c results are standardized based on the (NGSP) National   Glycohemoglobin Standardization Program.      Hemoglobin A1C levels are related to mean serum/plasma glucose   during the preceding 2-3 months.        08/13/2020 6.0 (H) 0.0 - 5.6 % Final     Comment:     Reference Interval:  5.0 - 5.6 Normal   5.7 - 6.4 High Risk   >  6.5 Diabetic    Hgb A1c results are standardized based on the (NGSP) National   Glycohemoglobin Standardization Program.    Hemoglobin A1C levels are related to mean serum/plasma glucose   during the preceding 2-3 months.            Review of Systems   Constitutional: Negative for chills and fever.   Cardiovascular:  Negative for claudication.   Skin:  Negative for color change and nail changes.   Musculoskeletal:  Negative for joint swelling, muscle cramps and muscle weakness.   Gastrointestinal:  Negative for nausea and vomiting.   Neurological:  Negative for numbness and paresthesias.   Psychiatric/Behavioral:  Negative for altered mental status.         Objective:     Physical Exam  Constitutional:       Appearance: Normal appearance. He is not ill-appearing.   Cardiovascular:      Pulses:           Dorsalis pedis pulses are 2+ on the right side and 2+ on the left side.        Posterior tibial pulses are 2+ on the right side and 2+ on the left side.      Comments: CFT is < 3 seconds bilateral.  Pedal hair growth is present bilateral.  Toes are warm to touch bilateral.    Musculoskeletal:         General: Tenderness and deformity present. No signs of injury.      Right lower leg: No edema.      Left lower leg: No edema.      Comments: Muscle strength 5/5 in all muscle groups bilateral.  No tenderness nor crepitation with ROM of foot/ankle joints bilateral.      Pain with palpation to the bursa overlying the Rt. Bunion and tailor bunion deformities.  Pain with palpation to met heads 2-5 of the Rt. Foot.  (-) Lachman sign noted.  Semi-rigid hammertoe deformities to digits 2-5 of the Rt. Foot.     Left foot bunion deformity noted with the foot noting edema and mild discoloration to the foot, no pain today with palpation.    Skin:     General: Skin is warm and dry.      Capillary Refill: Capillary refill takes 2 to 3 seconds.      Findings: No bruising, ecchymosis, erythema, signs of injury, laceration, lesion,  petechiae or rash.      Comments: Pedal skin has normal turgor, temperature, and texture bilateral.  Toenails x 10 appear normotrophic. Examination of the skin reveals no evidence of significant maceration, rashes, open lesions, suspicious appearing nevi or other concerning lesions.       Neurological:      Mental Status: He is alert.      Sensory: No sensory deficit.      Motor: No weakness or atrophy.      Comments: Light touch is intact bilateral.             Assessment:      Encounter Diagnoses   Name Primary?    Hallux valgus of right foot Yes    Tailor's bunion of right foot     Metatarsus adductus of both feet     Arthritis of midfoot        Plan:     Ashwin was seen today for foot pain.    Diagnoses and all orders for this visit:    Hallux valgus of right foot  -     ORTHOTIC DEVICE (DME)    Tailor's bunion of right foot  -     ORTHOTIC DEVICE (DME)    Metatarsus adductus of both feet  -     ORTHOTIC DEVICE (DME)    Arthritis of midfoot  -     ORTHOTIC DEVICE (DME)        I counseled the patient on his conditions, their implications and medical management.    Left foot: Gout flares are being controlled with colchicine as needed and he has started allopurinol to bring the uric acid levels down, if there is another flare I offered him to come in for steroid injections in the foot as needed. Drink plenty of water, use warm water soaks or heating pads as needed. Avoid purine containing foods, shellfish processed meats, beer, etc.    Right foot: X-rays were reviewed, and there is noted severe NC joint arthritis with mid arch collapse, bunion formation is noted but with forefoot metatarsus adductus also at the 2-3 metatarsals. Surgery would need to include a metatarsus adductus correction with 1-3 tarsometatarsal fusion as well as an NC joint fusion. He would be 6 weeks non weight bearing followed by 4-6 more weeks weight bearing in a tall orthopedic boot before getting back to tennis shoes, and will need PT at  that point to get back to normal strength and activity. In the mean time I recommend custom orthotic inserts, prescription dispensed and a list of places where he can go to get them made.    Will return in 2 months to follow up how he is doing with the custom orthotic inserts and voltaren gel as needed, we can consider surgery if he is still in intense pain despite the custom inserts.    Marshall Gill DPM

## 2024-03-12 ENCOUNTER — TELEPHONE (OUTPATIENT)
Dept: HEMATOLOGY/ONCOLOGY | Facility: CLINIC | Age: 69
End: 2024-03-12
Payer: MEDICARE

## 2024-03-12 ENCOUNTER — OFFICE VISIT (OUTPATIENT)
Dept: OTOLARYNGOLOGY | Facility: CLINIC | Age: 69
End: 2024-03-12
Payer: MEDICARE

## 2024-03-12 VITALS — WEIGHT: 207.25 LBS | BODY MASS INDEX: 29.67 KG/M2 | HEIGHT: 70 IN

## 2024-03-12 DIAGNOSIS — K13.79 MASS OF ORAL CAVITY: Primary | ICD-10-CM

## 2024-03-12 DIAGNOSIS — Z87.891 PAST HISTORY OF CHEWING TOBACCO USE: ICD-10-CM

## 2024-03-12 PROCEDURE — 88307 TISSUE EXAM BY PATHOLOGIST: CPT | Mod: PO | Performed by: PATHOLOGY

## 2024-03-12 PROCEDURE — 88342 IMHCHEM/IMCYTCHM 1ST ANTB: CPT | Mod: PO | Performed by: PATHOLOGY

## 2024-03-12 PROCEDURE — 88342 IMHCHEM/IMCYTCHM 1ST ANTB: CPT | Mod: 26,,, | Performed by: PATHOLOGY

## 2024-03-12 PROCEDURE — 99213 OFFICE O/P EST LOW 20 MIN: CPT | Mod: 25,PBBFAC,PO | Performed by: STUDENT IN AN ORGANIZED HEALTH CARE EDUCATION/TRAINING PROGRAM

## 2024-03-12 PROCEDURE — 99204 OFFICE O/P NEW MOD 45 MIN: CPT | Mod: 25,S$PBB,, | Performed by: STUDENT IN AN ORGANIZED HEALTH CARE EDUCATION/TRAINING PROGRAM

## 2024-03-12 PROCEDURE — 88307 TISSUE EXAM BY PATHOLOGIST: CPT | Mod: 26,,, | Performed by: PATHOLOGY

## 2024-03-12 PROCEDURE — 99999 PR PBB SHADOW E&M-EST. PATIENT-LVL III: CPT | Mod: PBBFAC,,, | Performed by: STUDENT IN AN ORGANIZED HEALTH CARE EDUCATION/TRAINING PROGRAM

## 2024-03-12 PROCEDURE — 40810 EXCISION OF MOUTH LESION: CPT | Mod: PBBFAC,PO | Performed by: STUDENT IN AN ORGANIZED HEALTH CARE EDUCATION/TRAINING PROGRAM

## 2024-03-12 PROCEDURE — 40810 EXCISION OF MOUTH LESION: CPT | Mod: S$PBB,,, | Performed by: STUDENT IN AN ORGANIZED HEALTH CARE EDUCATION/TRAINING PROGRAM

## 2024-03-12 RX ORDER — CEPHALEXIN 500 MG/1
500 CAPSULE ORAL 4 TIMES DAILY
COMMUNITY
End: 2024-04-15

## 2024-03-12 NOTE — PATIENT INSTRUCTIONS
Oral Lichen Planus    Lichen planus is a chronic condition that causes red, purple or white spots on the skin that can sometimes itch or hurt. Oral lichen planus is a chronic condition that causes redness or white lace-like patterns in the mouth that will wax and wane over time. It can be painful and make it hard to eat. We do not yet know what causes lichen planus and there is no cure. The primary goal of treatment is to alleviate symptoms. Asymptomatic oral lichen planus does not require treatment. Local therapies are preferred over systemic therapies. Topical steroids are the drug of choice for local treatment. Systemic therapy is primarily reserved for patients experiencing involvement of other sites, such as ocular (ophthalmologist), laryngeal, esophageal (gastroenterologist), vulvovaginal (gynecologist).     We recommend minimizing exposure to factors that exacerbate oral lichen planus. We encourage the following:  Maintenance of good oral hygiene (brush teeth twice daily with a soft bristled toothbrush and a bland toothpaste, flossing daily, professional dental cleanings every 4-6 months)  Elimination of mechanical irritation from dental restorations, dental appliances, sharp cracked or broken teeth, misaligned bite  Avoidance of habits that cause trauma (chewing on lips or cheeks)  Cessation of smoking or smokeless tobacco  Reduced consumption of acidic, salty, spicy, hot, sharp, or rough foods    First-line drug therapy:  Topical oral steroid   Gel or paste:  Clobetasol, Fluocinonide, Betamethasone, Triamcinolone.  Apply 3-4 times per day with a cotton-tipped applicator.  Avoid eating or drinking for 30 minutes after each application.  As symptoms improve, the frequency of application should decrease.   Elixir or suspensions:  Dexamethasone. Used as a mouth rinse 4-6 times per day for patients with more widespread involvement.     Second-line drug therapy:  Medicines called calcineurin inhibitors:   Pimecrolimus, Tacrolimus  Medicines that numb for pain    Side effects: Oral yeast infections have been known to occur with steroid use in the mouth. In these cases, an antifungal is used.

## 2024-03-12 NOTE — NURSING
Received msg from [facility] staff of referral for patient to see Dr. Anneliese Dodson. Reviewed appt info and location with him and he verbalized understanding. Pathology request has been expedited by Dr. Bolton      Oncology Navigation   Intake  Cancer Type: Head and Neck  Type of Referral: Internal  Date of Referral: 03/12/24  Initial Nurse Navigator Contact: 03/12/24  Referral to Initial Contact Timeline (days): 0  Date Worked: 03/12/24  First Appointment Available: 03/14/24  Appointment Date: 03/14/24  Schedule to Appointment Timeline (days): 2  First Available Date vs. Scheduled Date (days): 0     Treatment  Current Status: Staging work-up    Surgical Oncologist: Dr. Anneliese Dodson ([facility] surg onc)  Consult Date: 03/14/24          Procedures: Biopsy  Biopsy Schedule Date: 03/12/24                 Acuity      Follow Up  No follow-ups on file.

## 2024-03-12 NOTE — PROGRESS NOTES
Otolaryngology Clinic Note    Subjective:       Patient ID: Ashwin Thompson is a 69 y.o. male.    Chief Complaint: white spot on gums      History of Present Illness: Ashwin Thompson is a 69 y.o. male presenting with mouth lesion    He fatimah to his dentist for routine cleaning in August and noticed he had some white patches in his mouth that he wanted he referred to ENT. He saw Dr. Peterson and had a biopsy performed in August which showed squamous mucosa with hyperplasia with basal atypia. He then saw Dr. Tyson in [place] and was told it was Lichen Planus. Its on the right lower gum line. He is having pain that started last week. He states his mouth has always been sensitive to spicy food. He is using perioxal which helps some.     It flares up and last for a few days then gets better. Past 2 weeks has grown and more sensitive. Was just on the cheek mucosa, now onto the gums. No weight loss. No neck masses. No ear pain.     His HgA1c was 6 on last lab. He was on steroids in January for his gout but not sure if it helped his mouth. He has having a gout flare-up at the time.      He has been diagnosed with Lichen Plantus was being managed by Dr. Peterson at [facility], biopsy performed in September. He had a follow up in October and it was recommended that he proceed with CO2 laser ablation under local. He did not proceed with the procedure because he was not sure why he wanted the procedure.     His bother just  last Saturday.     He denies any tobacco use, he did use dip in the past but none in the last 2 years. He used dip x30 years. No cigarettes.      Past Surgical History:   Procedure Laterality Date    GANGLION CYST EXCISION  2020    TONSILLECTOMY       Past Medical History:   Diagnosis Date    Arthritis     Hyperlipidemia     Hypertension     Hypothyroid     Kidney stone     Thyroid disease      Social Determinants of Health     Tobacco Use: Medium Risk (3/12/2024)    Patient History      Smoking Tobacco Use: Never     Smokeless Tobacco Use: Former     Passive Exposure: Not on file   Alcohol Use: Not At Risk (3/11/2024)    AUDIT-C     Frequency of Alcohol Consumption: Never     Average Number of Drinks: Patient does not drink     Frequency of Binge Drinking: Never   Financial Resource Strain: Low Risk  (3/11/2024)    Overall Financial Resource Strain (CARDIA)     Difficulty of Paying Living Expenses: Not hard at all   Food Insecurity: No Food Insecurity (3/11/2024)    Hunger Vital Sign     Worried About Running Out of Food in the Last Year: Never true     Ran Out of Food in the Last Year: Never true   Transportation Needs: No Transportation Needs (3/11/2024)    PRAPARE - Transportation     Lack of Transportation (Medical): No     Lack of Transportation (Non-Medical): No   Physical Activity: Sufficiently Active (3/11/2024)    Exercise Vital Sign     Days of Exercise per Week: 3 days     Minutes of Exercise per Session: 150+ min   Stress: No Stress Concern Present (3/11/2024)    [place] [employer] - Occupational Stress Questionnaire     Feeling of Stress : Only a little   Social Connections: Unknown (3/11/2024)    Social Connection and Isolation Panel [NHANES]     Frequency of Communication with Friends and Family: More than three times a week     Frequency of Social Gatherings with Friends and Family: Twice a week     Attends Sabianist Services: Not on file     Active Member of Clubs or Organizations: Yes     Attends Club or Organization Meetings: More than 4 times per year     Marital Status:    Housing Stability: Low Risk  (3/11/2024)    Housing Stability Vital Sign     Unable to Pay for Housing in the Last Year: No     Number of Places Lived in the Last Year: 1     Unstable Housing in the Last Year: No   Depression: Low Risk  (1/27/2023)    Depression     Last PHQ-4: Flowsheet Data: 0     Review of patient's allergies indicates:   Allergen Reactions    Felodipine  Swelling    Blueberry Diarrhea    Zocor [simvastatin]      Bone and joint pain     Current Outpatient Medications   Medication Instructions    acebutoloL (SECTRAL) 200 MG capsule No dose, route, or frequency recorded.    allopurinoL (ZYLOPRIM) 100 mg, Oral, Daily    aspirin (ECOTRIN) 81 mg, Oral, Daily    bempedoic acid (NEXLETOL) 180 mg Tab Take 1 tablet every day by oral route for 30 days.    cephALEXin (KEFLEX) 500 mg, Oral, 4 times daily    colchicine (COLCRYS) 0.6 mg tablet Oral    flaxseed oiL Oil Misc.(Non-Drug; Combo Route)    hydroCHLOROthiazide (HYDRODIURIL) 25 mg, Oral, Daily    levothyroxine (SYNTHROID) 75 mcg, Oral, Daily    MAGNESIUM CITRATE ORAL 1 capsule, Oral, 2 times daily    NIFEdipine (ADALAT CC) 30 mg, Oral, Daily    olmesartan (BENICAR) 40 mg, Oral, Daily    potassium citrate (UROCIT-K) 5 mEq (540 mg) TbSR 10 mEq, Oral, 3 times daily with meals    terazosin (HYTRIN) 5 mg, Oral, Nightly    vitamin D (VITAMIN D3) 1,000 Units, Oral, Daily    vitamin K2 100 mcg Cap Oral         ENT ROS negative except as stated above.     Patient answers are not available for this visit.            Objective:      There were no vitals filed for this visit.    General: NAD, well appearing  Eyes: Normal conjunctiva and lids  Face: symmetric, nerve intact  Nose: The nose is without any evidence of any deformity. The nasal mucosa is moist. The septum is midline. There is no evidence of septal hematoma. The turbinates are without abnormality.   Ears: The ears are with normal-appearing pinna. Examination of the canals is normal appearing bilaterally. There is no drainage or erythema noted. The tympanic membranes are normal appearing with pearly color, normal-appearing landmarks and normal light reflex. Hearing is grossly intact.  Mouth: No obvious abnormalities to the lips. The teeth are unremarkable. Theres is leukoplakic mass over right gingiva and buccal mucosa, the alveolar portion is thick, ulcerated/granular,  concerning for malignancy.   Oropharynx: The uvula is midline.  The tongue is midline. The posterior pharynx is without erythema or exudate. The tonsils are normal appearing.  Salivary glands: The salivary glands are symmetric and not enlarged, no masses  Neck: No lymphadenopathy, trachea midline, thryoid not enlarged.  Psych: Normal mood and affect.   Neuro: Grossly intact  Speech: fluent              PROCEDURE:   Written consent obtained. Time out performed.     Biopsy/Excision of right oral mass    Indication: mass of right oral cavity    Findings: 4 cm x 2 cm white, granular mass along gingiva and gingivobuccal fold on right    Blood loss: < 1 ml    Specimen: right oral cavity mass    Anesthesia: 1% 1:100,000 lidocaine with epinephrine.     Procedure: local injected. 5 mm punch biopsy used at anterior mass. Curved scissors used to dissect specimen along base.  Silver nitrate used for cautery.  Specimen sent for pathology.    Patient tolerated procedure well.       Assessment and Plan:       1. Mass of oral cavity    2. Past history of chewing tobacco use          Biopsy today from anterior thicker portion. Reviewed that I am concerned that this has progressed to malignancy since last seen. Could be severe dysplasia only but either way will need surgery.      I will have him follow up with Dr. Dodson.   I will wait on pathology results prior to ordering Cts.     Soft diet, avoid right side next week. OTC pain meds. Contact if not controlling.     RTC: with Dr. Dodson.     Plan of care was discussed in detail with the patient, who agreed with the plan as above. All questions were answered in detail.     Azul Bolton MD  Otolaryngology

## 2024-03-14 ENCOUNTER — OFFICE VISIT (OUTPATIENT)
Dept: HEMATOLOGY/ONCOLOGY | Facility: CLINIC | Age: 69
End: 2024-03-14
Payer: MEDICARE

## 2024-03-14 ENCOUNTER — TELEPHONE (OUTPATIENT)
Dept: HEMATOLOGY/ONCOLOGY | Facility: CLINIC | Age: 69
End: 2024-03-14
Payer: MEDICARE

## 2024-03-14 VITALS
DIASTOLIC BLOOD PRESSURE: 81 MMHG | WEIGHT: 209.88 LBS | SYSTOLIC BLOOD PRESSURE: 159 MMHG | RESPIRATION RATE: 16 BRPM | HEIGHT: 70 IN | BODY MASS INDEX: 30.05 KG/M2 | HEART RATE: 60 BPM | TEMPERATURE: 98 F | OXYGEN SATURATION: 97 %

## 2024-03-14 DIAGNOSIS — C06.9 CANCER OF ORAL CAVITY: ICD-10-CM

## 2024-03-14 DIAGNOSIS — K13.79 MASS OF ORAL CAVITY: Primary | ICD-10-CM

## 2024-03-14 PROBLEM — C14.8: Status: ACTIVE | Noted: 2024-03-14

## 2024-03-14 PROCEDURE — 99215 OFFICE O/P EST HI 40 MIN: CPT | Mod: 24,PBBFAC,PN | Performed by: STUDENT IN AN ORGANIZED HEALTH CARE EDUCATION/TRAINING PROGRAM

## 2024-03-14 PROCEDURE — 99999 PR PBB SHADOW E&M-EST. PATIENT-LVL V: CPT | Mod: PBBFAC,,, | Performed by: STUDENT IN AN ORGANIZED HEALTH CARE EDUCATION/TRAINING PROGRAM

## 2024-03-14 PROCEDURE — 99215 OFFICE O/P EST HI 40 MIN: CPT | Mod: 24,S$PBB,, | Performed by: STUDENT IN AN ORGANIZED HEALTH CARE EDUCATION/TRAINING PROGRAM

## 2024-03-14 RX ORDER — SODIUM CHLORIDE 0.9 % (FLUSH) 0.9 %
2 SYRINGE (ML) INJECTION EVERY 6 HOURS PRN
Status: CANCELLED | OUTPATIENT
Start: 2024-03-14

## 2024-03-14 RX ORDER — HYDROMORPHONE HYDROCHLORIDE 2 MG/1
TABLET ORAL
COMMUNITY
End: 2024-04-15

## 2024-03-14 RX ORDER — LIDOCAINE HYDROCHLORIDE 10 MG/ML
1 INJECTION, SOLUTION EPIDURAL; INFILTRATION; INTRACAUDAL; PERINEURAL ONCE
Status: CANCELLED | OUTPATIENT
Start: 2024-03-14 | End: 2024-03-14

## 2024-03-14 RX ORDER — LORAZEPAM 1 MG/1
TABLET ORAL
COMMUNITY
End: 2024-04-15

## 2024-03-14 NOTE — PROGRESS NOTES
Note to patients: In accordance with the  Cent[employer] Cures Act, patients are now granted immediate electronic access to their medical records. This note is primarily intended for communication among medical professionals. As a result, it may incorporate medical terminology, abbreviations, or language that could appear blunt or unfamiliar. If you have questions about this document, we encourage you to discuss it with your physician.      [facility]  [facility]    Patient: Ashwin Thompson    : 1955    MRN: [id number]  Primary Care Provider: Emilee Mcintosh NP  Referring Provider: Azul Bolton MD   Date of Encounter: 2024    DIAGNOSIS: pending  Cancer Staging   No matching staging information was found for the patient.    CC:   Chief Complaint   Patient presents with    cancer       HPI:   Ashwin Thompson is a 69 y.o. 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 He went to his dentist for routine cleaning in August and noticed he had some white plaques in his mouth that he wanted he referred to ENT. At that time there seemed to be several in multiple locations. He saw Dr. Peterson at [facility] and had a biopsy performed in August which showed squamous mucosa with hyperplasia with basal atypia. He was recommended to have the area ablated with CO2 laser. He was given the option of having the procedure in clinic vs the OR. He didn't feel that he understood completely the reason for the procedure or how to choose anesthetic so he went for a second opinion. He then saw Dr. Tyson (ENT) in [place] and was told it was leukoplakia, but he didn't have the proper equipment for laser ablation.     The lesion is on the right lower gum line. He is having pain that started last week. He states his mouth has always been sensitive to spicy food. He is using perioxal which helps some. He reports that it flares up and last for a few days then gets  better, but for the past 2 weeks has grown and more sensitive. Was just on the cheek mucosa, now onto the gums.     Patient denies pain, fever, chills, night sweats, unintentional weight loss, neck mass, enlarged lymph nodes outside of the head or neck, odynophagia, dysphagia, globus sensation, voice changes, cough, hemoptysis, shortness of breath, or new or concerning skin lesions.     Hx of gout and cholesterol problems - going to start immunotherapy for cholesterol soon.    TREATMENT HISTORY:  N/A    SUBSTANCE USE:  Smoking: Hx of smokeless tobacco - quit 2 years ago  Denies cigarettes, hookah, marijuana, betel nut, illicit drug, heavy cigar, vape use.    ALLERGIES:  Review of patient's allergies indicates:   Allergen Reactions    Felodipine Swelling    Blueberry Diarrhea    Zocor [simvastatin]      Bone and joint pain         MEDICATIONS:    Current Outpatient Medications:     acebutoloL (SECTRAL) 200 MG capsule, , Disp: , Rfl:     allopurinoL (ZYLOPRIM) 100 MG tablet, Take 1 tablet (100 mg total) by mouth once daily., Disp: 90 tablet, Rfl: 0    aspirin (ECOTRIN) 81 MG EC tablet, Take 81 mg by mouth once daily., Disp: , Rfl:     cephALEXin (KEFLEX) 500 MG capsule, Take 500 mg by mouth 4 (four) times daily., Disp: , Rfl:     hydroCHLOROthiazide (HYDRODIURIL) 25 MG tablet, Take 1 tablet (25 mg total) by mouth once daily., Disp: 30 tablet, Rfl: 11    levothyroxine (SYNTHROID) 75 MCG tablet, Take 1 tablet (75 mcg total) by mouth once daily., Disp: 90 tablet, Rfl: 1    NIFEdipine (ADALAT CC) 30 MG TbSR, Take 1 tablet (30 mg total) by mouth once daily., Disp: 90 tablet, Rfl: 3    olmesartan (BENICAR) 40 MG tablet, Take 1 tablet (40 mg total) by mouth once daily., Disp: 90 tablet, Rfl: 3    potassium citrate (UROCIT-K) 5 mEq (540 mg) TbSR, Take 2 tablets (10 mEq total) by mouth 3 (three) times daily with meals., Disp: 180 tablet, Rfl: 11    terazosin (HYTRIN) 5 MG capsule, Take 1 capsule (5 mg total) by mouth every  evening., Disp: 30 capsule, Rfl: 11    vitamin D (VITAMIN D3) 1000 units Tab, Take 1,000 Units by mouth once daily., Disp: , Rfl:     vitamin K2 100 mcg Cap, Take by mouth., Disp: , Rfl:     bempedoic acid (NEXLETOL) 180 mg Tab, Take 1 tablet every day by oral route for 30 days., Disp: , Rfl:     colchicine (COLCRYS) 0.6 mg tablet, Take by mouth., Disp: , Rfl:     flaxseed oiL Oil, by Misc.(Non-Drug; Combo Route) route., Disp: , Rfl:     HYDROmorphone (DILAUDID) 2 MG tablet, Take 1 tablet every 6 hours by oral route as needed for 5 days., Disp: , Rfl:     LORazepam (ATIVAN) 1 MG tablet, Take 1 tablet as needed by oral route as directed., Disp: , Rfl:     MAGNESIUM CITRATE ORAL, Take 1 capsule by mouth 2 (two) times a day., Disp: , Rfl:     PAST MEDICAL HISTORY:  Past Medical History:   Diagnosis Date    Arthritis     Hyperlipidemia     Hypertension     Hypothyroid     Kidney stone     Thyroid disease         PAST SURGICAL HISTORY:  Past Surgical History:   Procedure Laterality Date    GANGLION CYST EXCISION  2020    TONSILLECTOMY  196        FAMILY HISTORY:  Family History   Problem Relation Age of Onset    Hypertension Mother     Stroke Mother          at 78    Emphysema Father     COPD Father     Pneumonia Father     Heart disease Brother     Arthritis Brother        SOCIAL HISTORY:  Social History     Socioeconomic History    Marital status:    Occupational History    Occupation: air conditioning   Tobacco Use    Smoking status: Never    Smokeless tobacco: Former     Types: Chew    Tobacco comments:     2019 - states is weaning use of chew   Substance and Sexual Activity    Alcohol use: Not Currently     Comment: social    Drug use: No    Sexual activity: Yes     Partners: Female     Birth control/protection: Surgical     Social Determinants of Health     Financial Resource Strain: Low Risk  (3/11/2024)    Overall Financial Resource Strain (CARDIA)     Difficulty of Paying Living Expenses:  "Not hard at all   Food Insecurity: No Food Insecurity (3/11/2024)    Hunger Vital Sign     Worried About Running Out of Food in the Last Year: Never true     Ran Out of Food in the Last Year: Never true   Transportation Needs: No Transportation Needs (3/11/2024)    PRAPARE - Transportation     Lack of Transportation (Medical): No     Lack of Transportation (Non-Medical): No   Physical Activity: Sufficiently Active (3/11/2024)    Exercise Vital Sign     Days of Exercise per Week: 3 days     Minutes of Exercise per Session: 150+ min   Stress: No Stress Concern Present (3/11/2024)    [employer] - Occupational Stress Questionnaire     Feeling of Stress : Only a little   Social Connections: Unknown (3/11/2024)    Social Connection and Isolation Panel [NHANES]     Frequency of Communication with Friends and Family: More than three times a week     Frequency of Social Gatherings with Friends and Family: Twice a week     Active Member of Clubs or Organizations: Yes     Attends Club or Organization Meetings: More than 4 times per year     Marital Status:    Housing Stability: Low Risk  (3/11/2024)    Housing Stability Vital Sign     Unable to Pay for Housing in the Last Year: No     Number of Places Lived in the Last Year: 1     Unstable Housing in the Last Year: No     See above substance history    REVIEW OF SYSTEMS:   Comprehensive review of systems was discussed with the patient.  It is positive only for the above complaints.    PHYSICAL EXAMINATION:  Blood pressure (!) 159/81, pulse 60, temperature 97.5 °F (36.4 °C), temperature source Temporal, resp. rate 16, height 5' 10" (1.778 m), weight 95.2 kg (209 lb 14.1 oz), SpO2 97 %.    Constitutional: Non-toxic appearing.   Psychiatric: Appropriate mood and affect. Cooperative.  Voice: Non-dysphonic, speaking in full sentences.   Neurologic: Cranial nerves grossly intact, no focal deficits.  Head and face: Salivary glands are not " enlarged. Face is symmetric. CN VII strength intact.  Skin: No concerning skin lesions.   Eyes: Vision grossly intact, bilateral extraocular movements intact  Ears: Bilateral pinna, mastoid, external ear canal normal. Hearing intact.   Nose: External nose appears normal.   Lips: No ulcers or lesions  Oral cavity: 2 cm leukoplakia lesion on buccal surface of mandibular gingiva extending from the premolars posteriorly to the second molar. Site of prior biopsy healing appropriately. Does not grossly appear to have bony invasion. Small area of leukoplakia on contralateral mandibular gingiva. Mucosa is pink and moist. Buccal mucosa, gingiva, anterior tongue, floor of mouth, and hard palate appear normal.   Oropharynx: Mucosa is pink and moist. Soft palate and base of tongue are normal. Posterior pharyngeal wall normal. Tonsils are normal. No lesions.  Neck: Soft and flat,  no masses or lymphadenopathy. No palpable thyroid enlargement or nodules.  Respiratory: Chest expansion symmetric, no audible stridor or stertor. Breathing is unlabored. No active cough.    CLINICAL PHOTOS:  From Dr. Bolton's visit:    DATA REVIEWED:     LABORATORY:      Latest Ref Rng & Units 11/14/2023     8:14 AM 11/28/2023     8:31 AM 12/20/2023     8:25 AM   Thyroid Labs   Sodium 136 - 145 mmol/L 139  139     139  139    Potassium 3.5 - 5.1 mmol/L 4.7  4.0     4.0  4.2    Chloride 95 - 110 mmol/L 102  100     100  100    Carbon Dioxide 22 - 31 mmol/L 25  27     27  29    Glucose 70 - 110 mg/dL 146  106     106  113    Blood Urea Nitrogen 9 - 21 mg/dL 28  25     25  27    Creatinine 0.50 - 1.40 mg/dL 1.35  1.68     1.68  1.49    Calcium 8.4 - 10.2 mg/dL 9.4  9.3     9.3  9.7    Albumin 3.5 - 5.2 g/dL 4.3  4.4     4.4  4.6    Anion Gap 5 - 12 mmol/L 12  12     12  10         PATHOLOGY:  3/14/2024:     Abnormal   ORAL CAVITY MASS, BIOPSY:  Atypical verrucous proliferation.  See comment.    Comment:  This is a bulky hyperkeratotic and warty squamous  proliferation with dense lichenoid chronic inflammation.  There is a prominent endophytic component with expansive bulbous rete ridges concerning for pushing invasion.  The lesion is  predominantly cytologically bland with only mild atypia confined to the basal layer.  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  However, definitive diagnosis in this particular  setting is difficult without examination of a full excisional specimen. Dr Phil Biswas also reviewed this case and agrees with these findings.    P16 immunostaining is pending.  The results will be issued in a supplemental report.             Biopsy 3/12 pending    IMAGING:  N/A    ASSESSMENT AND PLAN:  1. Mass of oral cavity       Ashwin Thompson is a 69 y.o. male with right oral leukoplakia. High suspicion for dysplasia vs CIS vs invasive SCC.   - Explained that surgery would be main modality of treatment for most oral cavity lesions in this location. Main alternative would be radiation.  - For mild or moderate dysplasia, observation can be an option. For severe dysplasia/CIS, which I suspect is at least the case, I recommend a wide excision and ablation. For invasive cancer, I recommend a through and through excision of the mucosa to the mandible, with possible removal of teeth as well as drilling or sawing a portion of the mandible. This may require a skin graft or artificial graft (I.e. Integra, alloderm).  - CT neck with contrast if findings c/w malignancy  - We will call the patient with the results, with plan for surgery in the upcoming weeks        ADDENDUM:  Patient's pathology resulted after he had left. Findings suspicious for verrucous carcinoma.  - CT neck with contrast  - OR planning for excision of oral cavity lesion with possible skin graft. He will not need a neck dissection, teeth pulled, or bone drilling if the CT scan appears normal.

## 2024-03-14 NOTE — TELEPHONE ENCOUNTER
Called to review path report with patient. Will plan on wide excision and ablation with use of the CO2 laser and possible skin graft. He is unable to have surgery on March 27th so would like to proceed mid April. Adilene will call him to schedule a day.

## 2024-03-15 ENCOUNTER — TELEPHONE (OUTPATIENT)
Dept: HEMATOLOGY/ONCOLOGY | Facility: CLINIC | Age: 69
End: 2024-03-15
Payer: MEDICARE

## 2024-03-15 NOTE — TELEPHONE ENCOUNTER
Called and provided pt with CT, PAT, clinic appt, surgery and post op appt dates, times and locations.  Reviewed CT and pre op instructions w/ pt .  All questions answered to pt's satisfaction.

## 2024-03-18 ENCOUNTER — OFFICE VISIT (OUTPATIENT)
Dept: PODIATRY | Facility: CLINIC | Age: 69
End: 2024-03-18
Payer: MEDICARE

## 2024-03-18 VITALS — HEIGHT: 70 IN | WEIGHT: 209.88 LBS | BODY MASS INDEX: 30.05 KG/M2

## 2024-03-18 DIAGNOSIS — M1A.9XX0 CHRONIC GOUT INVOLVING TOE OF LEFT FOOT WITHOUT TOPHUS, UNSPECIFIED CAUSE: ICD-10-CM

## 2024-03-18 DIAGNOSIS — Q66.221 METATARSUS ADDUCTUS OF BOTH FEET: ICD-10-CM

## 2024-03-18 DIAGNOSIS — M19.079 ARTHRITIS OF MIDFOOT: Primary | ICD-10-CM

## 2024-03-18 DIAGNOSIS — Q66.222 METATARSUS ADDUCTUS OF BOTH FEET: ICD-10-CM

## 2024-03-18 LAB
FINAL PATHOLOGIC DIAGNOSIS: ABNORMAL
GROSS: ABNORMAL
Lab: ABNORMAL
SUPPLEMENTAL DIAGNOSIS: ABNORMAL

## 2024-03-18 PROCEDURE — 99999PBSHW PR PBB SHADOW TECHNICAL ONLY FILED TO HB: Mod: PBBFAC,,,

## 2024-03-18 PROCEDURE — 20600 DRAIN/INJ JOINT/BURSA W/O US: CPT | Mod: 50,S$PBB,, | Performed by: PODIATRIST

## 2024-03-18 PROCEDURE — 20600 DRAIN/INJ JOINT/BURSA W/O US: CPT | Mod: TA,PBBFAC,PO | Performed by: PODIATRIST

## 2024-03-18 PROCEDURE — 99999 PR PBB SHADOW E&M-EST. PATIENT-LVL III: CPT | Mod: PBBFAC,,, | Performed by: PODIATRIST

## 2024-03-18 PROCEDURE — 99213 OFFICE O/P EST LOW 20 MIN: CPT | Mod: PBBFAC,PO,25 | Performed by: PODIATRIST

## 2024-03-18 PROCEDURE — 99499 UNLISTED E&M SERVICE: CPT | Mod: S$PBB,,, | Performed by: PODIATRIST

## 2024-03-18 RX ORDER — METHYLPREDNISOLONE ACETATE 40 MG/ML
40 INJECTION, SUSPENSION INTRA-ARTICULAR; INTRALESIONAL; INTRAMUSCULAR; SOFT TISSUE
Status: COMPLETED | OUTPATIENT
Start: 2024-03-18 | End: 2024-03-18

## 2024-03-18 RX ADMIN — METHYLPREDNISOLONE ACETATE 40 MG: 40 INJECTION, SUSPENSION INTRA-ARTICULAR; INTRALESIONAL; INTRAMUSCULAR; SOFT TISSUE at 10:03

## 2024-03-18 NOTE — PROGRESS NOTES
Subjective:     Patient ID: Ashwin Thompson is a 69 y.o. male.    Chief Complaint: Hallux valgus of right foot (Left foot (gout) ) and CSI  (BOTH FEET SHOT)    Ashwin is a 69 y.o. male with a past medical history of Arthritis, Hyperlipidemia, Hypertension, Hypothyroid, Kidney stone, and Thyroid disease. The patient's chief complaint consists of painful hammertoes and bunion of the Rt. Foot.  Describes deep aching pain in the foot with all weight bearing.  Rates pain currently as a 6/10.  Symptoms are partially alleviated with rest.  Denies sustaining trauma to the limb. Attempts to wear supportive shoe gear with no improvement in symptoms.  Inquires as to how this issue can be resolved.  Denies any additional pedal complaints.      1/17/24: patient was seen as a referral from Dr. Cha for right foot pain needing possible surgical intervention for his bunion and midfoot pain. Since then the patient has started having left foot severe pain and swelling around the [place] holidays. He was seen by PCP and diagnosed with a gout flare it has calmed with colchicine but still has an occasional pain that starts up.     3/18/24: Patient returns for follow up did get his custom inserts but relates that sometimes his feet are too swollen to wear the new shoes and insert but they do help when wearing them. Pain at the left great toe joint with gout flare ups and right midfoot pain seeking injections today      Hemoglobin A1C   Date Value Ref Range Status   03/23/2022 5.6 0.0 - 5.6 % Final     Comment:     Reference Interval:  5.0 - 5.6 Normal   5.7 - 6.4 High Risk   > 6.5 Diabetic      Hgb A1c results are standardized based on the (NGSP) National   Glycohemoglobin Standardization Program.      Hemoglobin A1C levels are related to mean serum/plasma glucose   during the preceding 2-3 months.        08/12/2021 5.9 (H) 0.0 - 5.6 % Final     Comment:     Reference Interval:  5.0 - 5.6 Normal   5.7 - 6.4 High Risk   > 6.5  Diabetic      Hgb A1c results are standardized based on the (NGSP) National   Glycohemoglobin Standardization Program.      Hemoglobin A1C levels are related to mean serum/plasma glucose   during the preceding 2-3 months.        08/13/2020 6.0 (H) 0.0 - 5.6 % Final     Comment:     Reference Interval:  5.0 - 5.6 Normal   5.7 - 6.4 High Risk   > 6.5 Diabetic    Hgb A1c results are standardized based on the (NGSP) National   Glycohemoglobin Standardization Program.    Hemoglobin A1C levels are related to mean serum/plasma glucose   during the preceding 2-3 months.            Review of Systems   Constitutional: Negative for chills and fever.   Cardiovascular:  Negative for claudication.   Skin:  Negative for color change and nail changes.   Musculoskeletal:  Negative for joint swelling, muscle cramps and muscle weakness.   Gastrointestinal:  Negative for nausea and vomiting.   Neurological:  Negative for numbness and paresthesias.   Psychiatric/Behavioral:  Negative for altered mental status.         Objective:     Physical Exam  Constitutional:       Appearance: Normal appearance. He is not ill-appearing.   Cardiovascular:      Pulses:           Dorsalis pedis pulses are 2+ on the right side and 2+ on the left side.        Posterior tibial pulses are 2+ on the right side and 2+ on the left side.      Comments: CFT is < 3 seconds bilateral.  Pedal hair growth is present bilateral.  Toes are warm to touch bilateral.    Musculoskeletal:         General: Tenderness and deformity present. No signs of injury.      Right lower leg: No edema.      Left lower leg: No edema.      Comments: Muscle strength 5/5 in all muscle groups bilateral.  No tenderness nor crepitation with ROM of foot/ankle joints bilateral.      Pain with palpation to the bursa overlying the Lt. Bunion and tailor bunion deformities.  Pain with palpation to met heads 2-5 of the Rt. Foot.  (-) Lachman sign noted.  Semi-rigid hammertoe deformities to digits  2-5 of the Rt. Foot.     Left foot bunion deformity noted with the foot noting edema and mild discoloration to the foot    Right midfoot pain with palpation   Skin:     General: Skin is warm and dry.      Capillary Refill: Capillary refill takes 2 to 3 seconds.      Findings: No bruising, ecchymosis, erythema, signs of injury, laceration, lesion, petechiae or rash.      Comments: Pedal skin has normal turgor, temperature, and texture bilateral.  Toenails x 10 appear normotrophic. Examination of the skin reveals no evidence of significant maceration, rashes, open lesions, suspicious appearing nevi or other concerning lesions.       Neurological:      Mental Status: He is alert.      Sensory: No sensory deficit.      Motor: No weakness or atrophy.      Comments: Light touch is intact bilateral.             Assessment:      Encounter Diagnoses   Name Primary?    Arthritis of midfoot Yes    Metatarsus adductus of both feet     Chronic gout involving toe of left foot without tophus, unspecified cause          Plan:     Ashwin was seen today for hallux valgus of right foot and csi .    Diagnoses and all orders for this visit:    Arthritis of midfoot    Metatarsus adductus of both feet    Chronic gout involving toe of left foot without tophus, unspecified cause          I counseled the patient on his conditions, their implications and medical management.    Left foot: Gout flares are being controlled with colchicine as needed and he has started allopurinol to bring the uric acid levels down,     Obtained verbal consent from the patient to perform an injection into left first metatarsal phalangeal joint. Prepped skin with alcohol and skin anesthesia achieved with ethyl chloride. Injected 20 mg depo medrol with 1 mL of 1% plain lidocaine. Patient tolerated the procedure well. Instructed to rest, ice and elevate.     Right foot: X-rays were reviewed, and there is noted severe NC joint arthritis with mid arch collapse, bunion  formation is noted but with forefoot metatarsus adductus also at the 2-3 metatarsals. Surgery would need to include a metatarsus adductus correction with 1-3 tarsometatarsal fusion as well as an NC joint fusion. He would be 6 weeks non weight bearing followed by 4-6 more weeks weight bearing in a tall orthopedic boot before getting back to tennis shoes, and will need PT at that point to get back to normal strength and activity. In the mean time I recommend custom orthotic inserts, prescription dispensed and a list of places where he can go to get them made.    A steroid injection was performed at the [facility] midfoot NC joint area using 1% plain Lidocaine and 20 mg of depo emdrol. This was well tolerated.     Will return in 2 months to follow up how he is doing with the custom orthotic inserts and voltaren gel as needed, we can consider surgery if he is still in intense pain despite the custom inserts.    Marshall Gill DPM

## 2024-03-21 ENCOUNTER — TELEPHONE (OUTPATIENT)
Dept: HEMATOLOGY/ONCOLOGY | Facility: CLINIC | Age: 69
End: 2024-03-21
Payer: MEDICARE

## 2024-03-21 NOTE — TELEPHONE ENCOUNTER
Pt calling with questions about upcoming lab and CT, questions answered to pt's satisfaction. ----- Message from Janya Cruz sent at 3/21/2024 11:37 AM CDT -----  Type: Needs Medical Advice  Who Called:  Patient   Symptoms (please be specific):    How long has patient had these symptoms:    Pharmacy name and phone #:    Best Call Back Number: [phone]  Additional Information: Patient is requesting a call back from Adilene with a question.

## 2024-04-03 ENCOUNTER — HOSPITAL ENCOUNTER (OUTPATIENT)
Dept: RADIOLOGY | Facility: HOSPITAL | Age: 69
Discharge: HOME | End: 2024-04-03
Attending: STUDENT IN AN ORGANIZED HEALTH CARE EDUCATION/TRAINING PROGRAM
Payer: MEDICARE

## 2024-04-03 DIAGNOSIS — K13.79 MASS OF ORAL CAVITY: ICD-10-CM

## 2024-04-03 PROCEDURE — 25500020 PHARM REV CODE 255: Mod: PO | Performed by: STUDENT IN AN ORGANIZED HEALTH CARE EDUCATION/TRAINING PROGRAM

## 2024-04-03 PROCEDURE — 70491 CT SOFT TISSUE NECK W/DYE: CPT | Mod: 26,,, | Performed by: RADIOLOGY

## 2024-04-03 PROCEDURE — 70491 CT SOFT TISSUE NECK W/DYE: CPT | Mod: TC,PO

## 2024-04-03 RX ADMIN — IOHEXOL 75 ML: 350 INJECTION, SOLUTION INTRAVENOUS at 01:04

## 2024-04-04 ENCOUNTER — TELEPHONE (OUTPATIENT)
Dept: HEMATOLOGY/ONCOLOGY | Facility: CLINIC | Age: 69
End: 2024-04-04
Payer: MEDICARE

## 2024-04-04 NOTE — TELEPHONE ENCOUNTER
I called to review patients CT results since we had to cancel his appointment today. All answers addressed and he will follow-up for his visit on the 15th.

## 2024-04-15 ENCOUNTER — OFFICE VISIT (OUTPATIENT)
Dept: HEMATOLOGY/ONCOLOGY | Facility: CLINIC | Age: 69
End: 2024-04-15
Payer: MEDICARE

## 2024-04-15 VITALS
OXYGEN SATURATION: 96 % | RESPIRATION RATE: 16 BRPM | SYSTOLIC BLOOD PRESSURE: 139 MMHG | BODY MASS INDEX: 29.67 KG/M2 | HEIGHT: 70 IN | TEMPERATURE: 98 F | HEART RATE: 54 BPM | DIASTOLIC BLOOD PRESSURE: 70 MMHG | WEIGHT: 207.25 LBS

## 2024-04-15 DIAGNOSIS — K13.79 ACQUIRED DYSPLASIA OF ORAL CAVITY: Primary | ICD-10-CM

## 2024-04-15 PROCEDURE — 99214 OFFICE O/P EST MOD 30 MIN: CPT | Mod: S$PBB,,, | Performed by: STUDENT IN AN ORGANIZED HEALTH CARE EDUCATION/TRAINING PROGRAM

## 2024-04-15 PROCEDURE — 99999 PR PBB SHADOW E&M-EST. PATIENT-LVL IV: CPT | Mod: PBBFAC,,, | Performed by: STUDENT IN AN ORGANIZED HEALTH CARE EDUCATION/TRAINING PROGRAM

## 2024-04-15 PROCEDURE — 99214 OFFICE O/P EST MOD 30 MIN: CPT | Mod: PBBFAC,PN | Performed by: STUDENT IN AN ORGANIZED HEALTH CARE EDUCATION/TRAINING PROGRAM

## 2024-04-15 NOTE — PROGRESS NOTES
Note to patients: In accordance with the  Century Cures Act, patients are now granted immediate electronic access to their medical records. This note is primarily intended for communication among medical professionals. As a result, it may incorporate medical terminology, abbreviations, or language that could appear blunt or unfamiliar. If you have questions about this document, we encourage you to discuss it with your physician.      [facility]  [facility]    Patient: Ashwin Thompson    : 1955    MRN: [id number]  Primary Care Provider: Emilee Mcintosh NP  Referring Provider: No ref. provider found   Date of Encounter: 04/15/2024    DIAGNOSIS: pending   Cancer Staging   No matching staging information was found for the patient.      CC:   Chief Complaint   Patient presents with    mass of oral cavity       HPI:   Ashwin Thompson is a 69 y.o. 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 He went to his dentist for routine cleaning in August and noticed he had some white plaques in his mouth that he wanted he referred to ENT. At that time there seemed to be several in multiple locations. He saw Dr. Peterson at [facility] and had a biopsy performed in August which showed squamous mucosa with hyperplasia with basal atypia. He was recommended to have the area ablated with CO2 laser. He was given the option of having the procedure in clinic vs the OR. He didn't feel that he understood completely the reason for the procedure or how to choose anesthetic so he went for a second opinion. He then saw Dr. Tyson (ENT) in [place] and was told it was leukoplakia, but he didn't have the proper equipment for laser ablation.     The lesion is on the right lower gum line. He is having pain. He states his mouth has always been sensitive to spicy food. He is using perioxal which helps some. He reports that it flares up and last for a few days then gets  better, but for the past 2 weeks has grown and more sensitive. Was just on the cheek mucosa, now onto the gums.     Patient denies pain, fever, chills, night sweats, unintentional weight loss, neck mass, enlarged lymph nodes outside of the head or neck, odynophagia, dysphagia, globus sensation, voice changes, cough, hemoptysis, shortness of breath, or new or concerning skin lesions.     Hx of gout and cholesterol problems - going to start immunotherapy for cholesterol soon.    He presents today to review his CT which did not demonstrate any bony involvement. 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    TREATMENT HISTORY:  N/A    SUBSTANCE USE:  Smoking: Hx of smokeless tobacco - quit 2 years ago  Denies cigarettes, hookah, marijuana, betel nut, illicit drug, heavy cigar, vape use.    ALLERGIES:  Review of patient's allergies indicates:   Allergen Reactions    Felodipine Swelling    Blueberry Diarrhea    Zocor [simvastatin]      Bone and joint pain         MEDICATIONS:    Current Outpatient Medications:     acebutoloL (SECTRAL) 200 MG capsule, , Disp: , Rfl:     aspirin (ECOTRIN) 81 MG EC tablet, Take 81 mg by mouth once daily., Disp: , Rfl:     hydroCHLOROthiazide (HYDRODIURIL) 25 MG tablet, Take 1 tablet (25 mg total) by mouth once daily., Disp: 30 tablet, Rfl: 11    levothyroxine (SYNTHROID) 75 MCG tablet, Take 1 tablet (75 mcg total) by mouth once daily., Disp: 90 tablet, Rfl: 1    NIFEdipine (ADALAT CC) 30 MG TbSR, Take 1 tablet (30 mg total) by mouth once daily., Disp: 90 tablet, Rfl: 3    olmesartan (BENICAR) 40 MG tablet, Take 1 tablet (40 mg total) by mouth once daily., Disp: 90 tablet, Rfl: 3    potassium citrate (UROCIT-K) 5 mEq (540 mg) TbSR, Take 2 tablets (10 mEq total) by mouth 3 (three) times daily with meals., Disp: 180 tablet, Rfl: 11    terazosin (HYTRIN) 5 MG capsule, Take 1 capsule (5 mg total) by mouth every evening., Disp: 30 capsule, Rfl: 11    vitamin D (VITAMIN D3) 1000 units Tab, Take  "1,000 Units by mouth once daily., Disp: , Rfl:     vitamin K2 100 mcg Cap, Take by mouth., Disp: , Rfl:     bempedoic acid (NEXLETOL) 180 mg Tab, Take 1 tablet every day by oral route for 30 days. (Patient not taking: Reported on 4/15/2024), Disp: , Rfl:     colchicine (COLCRYS) 0.6 mg tablet, Take by mouth. (Patient not taking: Reported on 4/15/2024), Disp: , Rfl:     OTHER HISTORY  Past medical, surgical, family, and social histories have been updated and reviewed as needed since last visit.     See above substance history    REVIEW OF SYSTEMS:   Comprehensive review of systems was discussed with the patient.  It is positive only for the above complaints.    PHYSICAL EXAMINATION:  Blood pressure 139/70, pulse (!) 54, temperature 97.5 °F (36.4 °C), temperature source Temporal, resp. rate 16, height 5' 10" (1.778 m), weight 94 kg (207 lb 3.7 oz), SpO2 96%.    Constitutional: Non-toxic appearing.   Psychiatric: Appropriate mood and affect. Cooperative.  Voice: Non-dysphonic, speaking in full sentences.   Neurologic: Cranial nerves grossly intact, no focal deficits.  Head and face: Salivary glands are not enlarged. Face is symmetric. CN VII strength intact.  Skin: No concerning skin lesions.   Eyes: Vision grossly intact, bilateral extraocular movements intact  Ears: Bilateral pinna, mastoid, external ear canal normal. Hearing intact.   Nose: External nose appears normal.   Lips: No ulcers or lesions  Oral cavity: 2 cm leukoplakia lesion on buccal surface of mandibular gingiva extending from the premolars posteriorly to the second molar. Site of prior biopsy healing appropriately. Does not grossly appear to have bony invasion. Small area of leukoplakia on contralateral mandibular gingiva. Mucosa is pink and moist. Buccal mucosa, gingiva, anterior tongue, floor of mouth, and hard palate appear normal.   Oropharynx: Mucosa is pink and moist. Soft palate and base of tongue are normal. Posterior pharyngeal wall normal. " Tonsils are normal. No lesions.  Neck: Soft and flat,  no masses or lymphadenopathy. No palpable thyroid enlargement or nodules.  Respiratory: Chest expansion symmetric, no audible stridor or stertor. Breathing is unlabored. No active cough.    CLINICAL PHOTOS:  From Dr. Bolton's visit:    DATA REVIEWED:     LABORATORY:      Latest Ref Rng & Units 11/28/2023     8:31 AM 12/20/2023     8:25 AM 4/3/2024    12:57 PM   Thyroid Labs   Sodium 136 - 145 mmol/L 139     139  139     Potassium 3.5 - 5.1 mmol/L 4.0     4.0  4.2     Chloride 95 - 110 mmol/L 100     100  100     Carbon Dioxide 22 - 31 mmol/L 27     27  29     Glucose 70 - 110 mg/dL 106     106  113     Blood Urea Nitrogen 9 - 21 mg/dL 25     25  27     Creatinine 0.5 - 1.4 mg/dL 1.68     1.68  1.49  1.3    Calcium 8.4 - 10.2 mg/dL 9.3     9.3  9.7     Albumin 3.5 - 5.2 g/dL 4.4     4.4  4.6     Anion Gap 5 - 12 mmol/L 12     12  10          PATHOLOGY:  3/14/2024:     Abnormal   ORAL CAVITY MASS, BIOPSY:  Atypical verrucous proliferation.  See comment.    Comment:  This is a bulky hyperkeratotic and warty squamous proliferation with dense lichenoid chronic inflammation.  There is a prominent endophytic component with expansive bulbous rete ridges concerning for pushing invasion.  The lesion is  predominantly cytologically bland with only mild atypia confined to the basal layer.  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  However, definitive diagnosis in this particular  setting is difficult without examination of a full excisional specimen. Dr Phil Biswas also reviewed this case and agrees with these findings.    P16 immunostaining is pending.  The results will be issued in a supplemental report.             IMAGING:  N/A    ASSESSMENT AND PLAN:  1. Acquired dysplasia of oral cavity         Ashwin Thompson is a 69 y.o. male with right oral leukoplakia. High suspicion for dysplasia vs  verrucous carcinoma.  - Explained that surgery would be main modality of treatment for most oral cavity lesions in this location. Main alternative would be radiation.  - For mild or moderate dysplasia, observation can be an option. For severe dysplasia/CIS, which I suspect is at least the case, I recommend a wide excision and ablation. For invasive cancer, I recommend a through and through excision of the mucosa to the mandible, with possible removal of teeth as well as drilling or sawing a portion of the mandible. This may require a skin graft or artificial graft (I.e. Integra, alloderm).   - Given that this appears to be a verrucous carcinoma, we will proceed with wide local excision of the mucosa alone without lymph node dissection or mandibulotomy. If there is any concern on the final pathology, will be a two stage procedure.  - We will call the patient with the results, with plan for surgery next week    Follow-up in OR - will need CO2 laser

## 2024-04-24 PROBLEM — K13.79 MASS OF ORAL CAVITY: Status: ACTIVE | Noted: 2024-04-24

## 2024-04-25 ENCOUNTER — TELEPHONE (OUTPATIENT)
Dept: HEMATOLOGY/ONCOLOGY | Facility: CLINIC | Age: 69
End: 2024-04-25
Payer: MEDICARE

## 2024-04-25 DIAGNOSIS — C06.9 CANCER OF ORAL CAVITY: Primary | ICD-10-CM

## 2024-04-25 RX ORDER — CHLORHEXIDINE GLUCONATE ORAL RINSE 1.2 MG/ML
15 SOLUTION DENTAL 2 TIMES DAILY
Qty: 420 ML | Refills: 0 | Status: SHIPPED | OUTPATIENT
Start: 2024-04-25 | End: 2024-05-09

## 2024-04-25 NOTE — TELEPHONE ENCOUNTER
Pt is aware of post op appt.----- Message from Jayna Cruz sent at 4/25/2024  9:26 AM CDT -----  Type: Needs Medical Advice  Who Called:  Bhavya with [facility]  Symptoms (please be specific):    How long has patient had these symptoms:    Pharmacy name and phone #:    Best Call Back Number: [phone]  Additional Information: Patient is needing a call back to schedule a f/u from Hosp visit.

## 2024-04-25 NOTE — TELEPHONE ENCOUNTER
Pt calling to request -Chlorhexidine mouthwash prescription be placed with his pharmacy.  States he is doing well post op and has no additional questions or concerns.---- Message from Cynthia Ordoñez, Patient Care Assistant sent at 4/25/2024 10:35 AM CDT -----  Type: Needs Medical Advice  Who Called:  ashwin  Vince Call Back Number: [phone]    Additional Information: Ashwin would like a call back to  speak with someone  please call to further  discuss thank you .

## 2024-05-01 ENCOUNTER — OFFICE VISIT (OUTPATIENT)
Dept: HEMATOLOGY/ONCOLOGY | Facility: CLINIC | Age: 69
End: 2024-05-01
Payer: MEDICARE

## 2024-05-01 VITALS
OXYGEN SATURATION: 96 % | WEIGHT: 201.5 LBS | TEMPERATURE: 98 F | RESPIRATION RATE: 16 BRPM | HEIGHT: 70 IN | BODY MASS INDEX: 28.85 KG/M2 | HEART RATE: 58 BPM | DIASTOLIC BLOOD PRESSURE: 63 MMHG | SYSTOLIC BLOOD PRESSURE: 113 MMHG

## 2024-05-01 DIAGNOSIS — C06.9 CANCER OF ORAL CAVITY: Primary | ICD-10-CM

## 2024-05-01 PROCEDURE — 99024 POSTOP FOLLOW-UP VISIT: CPT | Mod: POP,,, | Performed by: NURSE PRACTITIONER

## 2024-05-01 PROCEDURE — 99214 OFFICE O/P EST MOD 30 MIN: CPT | Mod: PBBFAC,PN | Performed by: NURSE PRACTITIONER

## 2024-05-01 PROCEDURE — 99999 PR PBB SHADOW E&M-EST. PATIENT-LVL IV: CPT | Mod: PBBFAC,,, | Performed by: NURSE PRACTITIONER

## 2024-05-01 NOTE — PROGRESS NOTES
Note to patients: In accordance with the  Century Cures Act, patients are now granted immediate electronic access to their medical records. This note is primarily intended for communication among medical professionals. As a result, it may incorporate medical terminology, abbreviations, or language that could appear blunt or unfamiliar. If you have questions about this document, we encourage you to discuss it with your physician.      [facility]  [facility]    Patient: Ashwin Thompson    : 1955    MRN: [id number]  Primary Care Provider: Emilee Mcintosh NP  Referring Provider: No ref. provider found   Date of Encounter: 2024    DIAGNOSIS: pending   Cancer Staging   No matching staging information was found for the patient.      CC:   Post-op visit      HPI:   Ashwin Thompson is a 69 y.o. 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 He went to his dentist for routine cleaning in August and noticed he had some white plaques in his mouth that he wanted he referred to ENT. At that time there seemed to be several in multiple locations. He saw Dr. Peterson at [facility] and had a biopsy performed in August which showed squamous mucosa with hyperplasia with basal atypia. He was recommended to have the area ablated with CO2 laser. He was given the option of having the procedure in clinic vs the OR. He didn't feel that he understood completely the reason for the procedure or how to choose anesthetic so he went for a second opinion. He then saw Dr. Tyson (ENT) in [place] and was told it was leukoplakia, but he didn't have the proper equipment for laser ablation.     The lesion is on the right lower gum line. He is having pain. He states his mouth has always been sensitive to spicy food. He is using perioxal which helps some. He reports that it flares up and last for a few days then gets better, but for the past 2 weeks has grown and more  sensitive. Was just on the cheek mucosa, now onto the gums.     Patient denies pain, fever, chills, night sweats, unintentional weight loss, neck mass, enlarged lymph nodes outside of the head or neck, odynophagia, dysphagia, globus sensation, voice changes, cough, hemoptysis, shortness of breath, or new or concerning skin lesions.     Hx of gout and cholesterol problems - going to start immunotherapy for cholesterol soon.    4/15/24: He presents today to review his CT which did not demonstrate any bony involvement. 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    5/1/24: He is here today for his post-op visit. He is having some swelling he states in the inside of him mouth as the surgery site. He is on a full liquid diet. He is using the mouth wash morning and night and as well as anytime he eats or drinks. He is using tylenol 1-2x a day for pain control. He is staying hydrated.     TREATMENT HISTORY:  4/25/24: Wide local excision with laser, 4 teeth pulled, marginal mandibulectomy       SUBSTANCE USE:  Smoking: Hx of smokeless tobacco - quit 2 years ago  Denies cigarettes, hookah, marijuana, betel nut, illicit drug, heavy cigar, vape use.    ALLERGIES:  Review of patient's allergies indicates:   Allergen Reactions    Felodipine Swelling    Blueberry Diarrhea    Zocor [simvastatin]      Bone and joint pain         MEDICATIONS:    Current Outpatient Medications:     acebutoloL (SECTRAL) 200 MG capsule, Take 200 mg by mouth 2 (two) times daily., Disp: , Rfl:     allopurinoL (ZYLOPRIM) 100 MG tablet, Take 100 mg by mouth 2 (two) times daily., Disp: , Rfl:     aspirin (ECOTRIN) 81 MG EC tablet, Take 81 mg by mouth once daily., Disp: , Rfl:     bempedoic acid (NEXLETOL) 180 mg Tab, , Disp: , Rfl:     chlorhexidine (PERIDEX) 0.12 % solution, Use as directed 15 mLs in the mouth or throat 2 (two) times daily. for 14 days, Disp: 420 mL, Rfl: 0    colchicine (COLCRYS) 0.6 mg tablet, Take by mouth as needed., Disp: , Rfl:      "hydroCHLOROthiazide (HYDRODIURIL) 25 MG tablet, Take 1 tablet (25 mg total) by mouth once daily., Disp: 30 tablet, Rfl: 11    levothyroxine (SYNTHROID) 75 MCG tablet, Take 1 tablet (75 mcg total) by mouth once daily., Disp: 90 tablet, Rfl: 1    NIFEdipine (ADALAT CC) 30 MG TbSR, Take 1 tablet (30 mg total) by mouth once daily. (Patient taking differently: Take 30 mg by mouth every evening.), Disp: 90 tablet, Rfl: 3    olmesartan (BENICAR) 40 MG tablet, Take 1 tablet (40 mg total) by mouth once daily., Disp: 90 tablet, Rfl: 3    oxyCODONE (ROXICODONE) 5 MG immediate release tablet, Take 1 tablet (5 mg total) by mouth every 4 (four) hours as needed for Pain. (Patient not taking: Reported on 5/1/2024), Disp: 20 tablet, Rfl: 0    potassium citrate (UROCIT-K) 5 mEq (540 mg) TbSR, Take 2 tablets (10 mEq total) by mouth 3 (three) times daily with meals., Disp: 180 tablet, Rfl: 11    terazosin (HYTRIN) 5 MG capsule, Take 1 capsule (5 mg total) by mouth every evening. (Patient taking differently: Take 5 mg by mouth once daily.), Disp: 30 capsule, Rfl: 11    vitamin D (VITAMIN D3) 1000 units Tab, Take 1,000 Units by mouth once daily., Disp: , Rfl:     vitamin K2 100 mcg Cap, Take by mouth., Disp: , Rfl:     OTHER HISTORY  Past medical, surgical, family, and social histories have been updated and reviewed as needed since last visit.     See above substance history    REVIEW OF SYSTEMS:   Comprehensive review of systems was discussed with the patient.  It is positive only for the above complaints.    PHYSICAL EXAMINATION:  Blood pressure 113/63, pulse (!) 58, temperature 98.1 °F (36.7 °C), temperature source Temporal, resp. rate 16, height 5' 10" (1.778 m), weight 91.4 kg (201 lb 8 oz), SpO2 96%.    Constitutional: Non-toxic appearing.   Psychiatric: Appropriate mood and affect. Cooperative.  Voice: Non-dysphonic, speaking in full sentences.   Neurologic: Cranial nerves grossly intact, no focal deficits.  Head and face: " Salivary glands are not enlarged. Face is symmetric. CN VII strength intact.  Skin: No concerning skin lesions.   Eyes: Vision grossly intact, bilateral extraocular movements intact  Ears: Bilateral pinna, mastoid, external ear canal normal. Hearing intact.   Nose: External nose appears normal.   Lips: No ulcers or lesions  Oral cavity: sutures intact, small area of bone exposure on posterior lingual side. Mucosa is pink and moist. Buccal mucosa, gingiva, anterior tongue, floor of mouth, and hard palate appear normal. Suture on anterior tip of tongue intact. Leukoplakia b/t teeth 20&19  Oropharynx: Mucosa is pink and moist. Soft palate and base of tongue are normal. Posterior pharyngeal wall normal. Tonsils are normal. No lesions.  Neck: Soft and flat,  no masses or lymphadenopathy. No palpable thyroid enlargement or nodules.  Respiratory: Chest expansion symmetric, no audible stridor or stertor. Breathing is unlabored. No active cough.    CLINICAL PHOTOS:  Post-op visit 5/1/24:      Area of leukoplakia that we will be monitoring    From Dr. Bolton's visit:    DATA REVIEWED:     LABORATORY:      Latest Ref Rng & Units 12/20/2023     8:25 AM 4/3/2024    12:57 PM 4/17/2024    10:43 AM   Thyroid Labs   Sodium 136 - 145 mmol/L 139   137    Potassium 3.5 - 5.1 mmol/L 4.2   3.9    Chloride 95 - 110 mmol/L 100   103    Carbon Dioxide 22 - 31 mmol/L 29   26    Glucose 70 - 110 mg/dL 113   107    Blood Urea Nitrogen 9 - 21 mg/dL 27   25    Creatinine 0.50 - 1.40 mg/dL 1.49  1.3  1.29    Calcium 8.4 - 10.2 mg/dL 9.7   10.3    Albumin 3.5 - 5.2 g/dL 4.6      Anion Gap 5 - 12 mmol/L 10   8    WBC 3.90 - 12.70 K/uL   5.11    RBC 4.60 - 6.20 M/uL   4.78    Hemoglobin 14.0 - 18.0 g/dL   13.8    Hematocrit 40.0 - 54.0 %   41.2    MCV 82 - 98 fL   86    MCH 27.0 - 31.0 pg   28.9    MCHC 32.0 - 36.0 g/dL   33.5    RDW 11.5 - 14.5 %   14.4    Platelets 150 - 450 K/uL   206    MPV 9.2 - 12.9 fL   11.8    INR    1.0    APTT 24.6 - 36.7  sec   29.6         PATHOLOGY:  DIAGNOSIS:   04/26/2024 JAR   1. ORAL CAVITY, ANTERIOR TONGUE LESION, EXCISION   - FIBROMA   2. DENTAL MARGIN   - SCANT FRAGMENTS OF ATYPICAL SQUAMOUS MUCOSA   3. RIGHT ORAL CAVITY LESION, WIDE LOCAL EXCISION   - INVASIVE VERRUCOUS CARCINOMA   - TUMOR APPROACHES DEEP MARGIN OF THE MEDIAL TISSUE EDGE   - TUMOR AT LEAST 5 MM TO ALL REMAINING MARGINS 4. RIGHT MANDIBULAR    TEETH, EXCISION   - TEETH IDENTIFIED GROSSLY   - ADDITIONAL FRAGMENTS OF SQUAMOUS MUCOSA PRESENT   - NEGATIVE FOR TUMOR 5. TOOTH FRAGMENT, EXCISION   - TOOTH IDENTIFIED GROSSLY 6. INTERDENTAL MUCOSA, EXCISION   - SQUAMOUS MUCOSA   - NEGATIVE FOR TUMOR   - NEGATIVE FOR DYSPLASIA     ORAL CAVITY:   PROCEDURE: Wide local excision   TUMOR FOCALITY: Unifocal   TUMOR SITE: Right mandibular gingiva   TUMOR LATERALITY: Right   TUMOR SIZE: 24 mm   TUMOR DEPTH OF INVASION (MM): 2 mm   HISTOLOGIC TYPE: Invasive verrucous carcinoma   HISTOLOGIC GRADE: Well differentiated   MARGINS: Negative   DISTANCE FROM CLOSEST MARGIN (MM): Tumor approaches deep margin of the    medial tissue edge (see parts 4 and 5) Tumor at least 5 mm to all    remainder margins   SPECIFY MARGIN: N/A   PERINEURAL INVASION: Negative   LYMPHOVASCULAR INVASION: Negative   REGIONAL LYMPH NODES: N/A   NUMBER OF LYMPH NODES WITH TUMOR: N/A   LATERALITY OF NODES WITH TUMOR: N/A   SIZE OF LARGEST SHALA METASTATIC DEPOSIT: N/A   EXTRANODAL EXTENSION: N/A   NUMBER OF LYMPH NODES EXAMINED: 0   DISTANT METASTASIS: Not known   TNM CODE: pT2 pN not assigned (no nodes submitted / found)   Tumor at least 5 mm to all remainder margins   _______________________________________________________________________________________________________       3/14/2024:     Abnormal   ORAL CAVITY MASS, BIOPSY:  Atypical verrucous proliferation.  See comment.    Comment:  This is a bulky hyperkeratotic and warty squamous proliferation with dense lichenoid chronic inflammation.  There is a  prominent endophytic component with expansive bulbous rete ridges concerning for pushing invasion.  The lesion is  predominantly cytologically bland with only mild atypia confined to the basal layer.  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  However, definitive diagnosis in this particular  setting is difficult without examination of a full excisional specimen. Dr Phil Biswas also reviewed this case and agrees with these findings.    P16 immunostaining is pending.  The results will be issued in a supplemental report.             IMAGING:  N/A    ASSESSMENT AND PLAN:  1. Cancer of oral cavity        Ashwin Thompson is a 69 y.o. male with right oral leukoplakia. High suspicion for dysplasia vs verrucous carcinoma.  - Stay on Full liquids until healed, no chewing until healed  - Incision check in 1 week to assess healing   - Continue mouth rinses after every meal/drink and AM and PM  - May brush teeth with sensitive toothpaste, no whitening tooth paste  - No chewing until completely healed    Follow-up in 1 week

## 2024-05-09 ENCOUNTER — OFFICE VISIT (OUTPATIENT)
Dept: HEMATOLOGY/ONCOLOGY | Facility: CLINIC | Age: 69
End: 2024-05-09
Payer: MEDICARE

## 2024-05-09 VITALS
OXYGEN SATURATION: 95 % | BODY MASS INDEX: 28.12 KG/M2 | HEART RATE: 51 BPM | HEIGHT: 70 IN | DIASTOLIC BLOOD PRESSURE: 62 MMHG | RESPIRATION RATE: 16 BRPM | SYSTOLIC BLOOD PRESSURE: 115 MMHG | WEIGHT: 196.44 LBS | TEMPERATURE: 97 F

## 2024-05-09 DIAGNOSIS — C06.9 CANCER OF ORAL CAVITY: Primary | ICD-10-CM

## 2024-05-09 PROCEDURE — 99024 POSTOP FOLLOW-UP VISIT: CPT | Mod: POP,,, | Performed by: NURSE PRACTITIONER

## 2024-05-09 PROCEDURE — 99214 OFFICE O/P EST MOD 30 MIN: CPT | Mod: PBBFAC,PN | Performed by: NURSE PRACTITIONER

## 2024-05-09 PROCEDURE — 99999 PR PBB SHADOW E&M-EST. PATIENT-LVL IV: CPT | Mod: PBBFAC,,, | Performed by: NURSE PRACTITIONER

## 2024-05-09 RX ORDER — CEPHALEXIN 500 MG/1
500 CAPSULE ORAL 3 TIMES DAILY
COMMUNITY
Start: 2024-05-03

## 2024-05-09 NOTE — PROGRESS NOTES
Note to patients: In accordance with the  Century Cures Act, patients are now granted immediate electronic access to their medical records. This note is primarily intended for communication among medical professionals. As a result, it may incorporate medical terminology, abbreviations, or language that could appear blunt or unfamiliar. If you have questions about this document, we encourage you to discuss it with your physician.      [facility]  [facility]    Patient: Ashwin Thompson    : 1955    MRN: [id number]  Primary Care Provider: Emilee Mcintosh NP  Referring Provider: No ref. provider found   Date of Encounter: 2024    DIAGNOSIS: pending   Cancer Staging   No matching staging information was found for the patient.      CC:   Post-op visit    HPI:   Ashwin Thompson is a 69 y.o. 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 He went to his dentist for routine cleaning in August and noticed he had some white plaques in his mouth that he wanted he referred to ENT. At that time there seemed to be several in multiple locations. He saw Dr. Peterson at [facility] and had a biopsy performed in August which showed squamous mucosa with hyperplasia with basal atypia. He was recommended to have the area ablated with CO2 laser. He was given the option of having the procedure in clinic vs the OR. He didn't feel that he understood completely the reason for the procedure or how to choose anesthetic so he went for a second opinion. He then saw Dr. Tyson (ENT) in [place] and was told it was leukoplakia, but he didn't have the proper equipment for laser ablation.     The lesion is on the right lower gum line. He is having pain. He states his mouth has always been sensitive to spicy food. He is using perioxal which helps some. He reports that it flares up and last for a few days then gets better, but for the past 2 weeks has grown and more  sensitive. Was just on the cheek mucosa, now onto the gums.     Patient denies pain, fever, chills, night sweats, unintentional weight loss, neck mass, enlarged lymph nodes outside of the head or neck, odynophagia, dysphagia, globus sensation, voice changes, cough, hemoptysis, shortness of breath, or new or concerning skin lesions.     Hx of gout and cholesterol problems - going to start immunotherapy for cholesterol soon.    4/15/24: He presents today to review his CT which did not demonstrate any bony involvement. 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    5/1/24: He is here today for his post-op visit. He is having some swelling he states in the inside of him mouth as the surgery site. He is on a full liquid diet. He is using the mouth wash morning and night and as well as anytime he eats or drinks. He is using tylenol 1-2x a day for pain control. He is staying hydrated.     5/9/24: He was having some swelling and pain on Monday but he states it is better today. He is still doing his mouth rinse. He is still on full liquids.    TREATMENT HISTORY:  4/25/24: Wide local excision with laser, 4 teeth pulled, marginal mandibulectomy       SUBSTANCE USE:  Smoking: Hx of smokeless tobacco - quit 2 years ago  Denies cigarettes, hookah, marijuana, betel nut, illicit drug, heavy cigar, vape use.    ALLERGIES:  Review of patient's allergies indicates:   Allergen Reactions    Felodipine Swelling    Blueberry Diarrhea    Zocor [simvastatin]      Bone and joint pain         MEDICATIONS:    Current Outpatient Medications:     acebutoloL (SECTRAL) 200 MG capsule, Take 200 mg by mouth 2 (two) times daily., Disp: , Rfl:     allopurinoL (ZYLOPRIM) 100 MG tablet, Take 100 mg by mouth 2 (two) times daily., Disp: , Rfl:     aspirin (ECOTRIN) 81 MG EC tablet, Take 81 mg by mouth once daily., Disp: , Rfl:     bempedoic acid (NEXLETOL) 180 mg Tab, , Disp: , Rfl:     chlorhexidine (PERIDEX) 0.12 % solution, Use as directed 15 mLs in the  mouth or throat 2 (two) times daily. for 14 days, Disp: 420 mL, Rfl: 0    colchicine (COLCRYS) 0.6 mg tablet, Take by mouth as needed., Disp: , Rfl:     hydroCHLOROthiazide (HYDRODIURIL) 25 MG tablet, Take 1 tablet (25 mg total) by mouth once daily., Disp: 30 tablet, Rfl: 11    levothyroxine (SYNTHROID) 75 MCG tablet, Take 1 tablet (75 mcg total) by mouth once daily., Disp: 90 tablet, Rfl: 1    NIFEdipine (ADALAT CC) 30 MG TbSR, Take 1 tablet (30 mg total) by mouth once daily. (Patient taking differently: Take 30 mg by mouth every evening.), Disp: 90 tablet, Rfl: 3    olmesartan (BENICAR) 40 MG tablet, Take 1 tablet (40 mg total) by mouth once daily., Disp: 90 tablet, Rfl: 3    potassium citrate (UROCIT-K) 5 mEq (540 mg) TbSR, Take 2 tablets (10 mEq total) by mouth 3 (three) times daily with meals., Disp: 180 tablet, Rfl: 11    terazosin (HYTRIN) 5 MG capsule, Take 1 capsule (5 mg total) by mouth every evening. (Patient taking differently: Take 5 mg by mouth once daily.), Disp: 30 capsule, Rfl: 11    vitamin D (VITAMIN D3) 1000 units Tab, Take 1,000 Units by mouth once daily., Disp: , Rfl:     vitamin K2 100 mcg Cap, Take by mouth., Disp: , Rfl:     cephALEXin (KEFLEX) 500 MG capsule, Take 500 mg by mouth 3 (three) times daily. (Patient not taking: Reported on 5/9/2024), Disp: , Rfl:     oxyCODONE (ROXICODONE) 5 MG immediate release tablet, Take 1 tablet (5 mg total) by mouth every 4 (four) hours as needed for Pain. (Patient not taking: Reported on 5/1/2024), Disp: 20 tablet, Rfl: 0    OTHER HISTORY  Past medical, surgical, family, and social histories have been updated and reviewed as needed since last visit.     See above substance history    REVIEW OF SYSTEMS:   Comprehensive review of systems was discussed with the patient.  It is positive only for the above complaints.    PHYSICAL EXAMINATION:  Blood pressure 115/62, pulse (!) 51, temperature 97.4 °F (36.3 °C), temperature source Temporal, resp. rate 16, height  "5' 10" (1.778 m), weight 89.1 kg (196 lb 6.9 oz), SpO2 95%.    Constitutional: Non-toxic appearing.   Psychiatric: Appropriate mood and affect. Cooperative.  Voice: Non-dysphonic, speaking in full sentences.   Neurologic: Cranial nerves grossly intact, no focal deficits.  Head and face: Salivary glands are not enlarged. Face is symmetric. CN VII strength intact.  Skin: No concerning skin lesions.   Eyes: Vision grossly intact, bilateral extraocular movements intact  Ears: Bilateral pinna, mastoid, external ear canal normal. Hearing intact.   Nose: External nose appears normal.   Lips: No ulcers or lesions  Oral cavity: sutures intact, small area of bone exposure on posterior lingual side. Surgery site healign well. No concerns for infection. Mucosa is pink and moist. Buccal mucosa, gingiva, anterior tongue, floor of mouth, and hard palate appear normal. Suture on anterior tip of tongue removed today. Leukoplakia b/t teeth 20&19  Oropharynx: Mucosa is pink and moist. Soft palate and base of tongue are normal. Posterior pharyngeal wall normal. Tonsils are normal. No lesions.  Neck: Soft and flat,  no masses or lymphadenopathy. No palpable thyroid enlargement or nodules.  Respiratory: Chest expansion symmetric, no audible stridor or stertor. Breathing is unlabored. No active cough.    CLINICAL PHOTOS:  Post-op visit 5/1/24:      Area of leukoplakia that we will be monitoring    From Dr. Bolton's visit:    DATA REVIEWED:     LABORATORY:      Latest Ref Rng & Units 12/20/2023     8:25 AM 4/3/2024    12:57 PM 4/17/2024    10:43 AM   Thyroid Labs   Sodium 136 - 145 mmol/L 139   137    Potassium 3.5 - 5.1 mmol/L 4.2   3.9    Chloride 95 - 110 mmol/L 100   103    Carbon Dioxide 22 - 31 mmol/L 29   26    Glucose 70 - 110 mg/dL 113   107    Blood Urea Nitrogen 9 - 21 mg/dL 27   25    Creatinine 0.50 - 1.40 mg/dL 1.49  1.3  1.29    Calcium 8.4 - 10.2 mg/dL 9.7   10.3    Albumin 3.5 - 5.2 g/dL 4.6      Anion Gap 5 - 12 mmol/L 10 "   8    WBC 3.90 - 12.70 K/uL   5.11    RBC 4.60 - 6.20 M/uL   4.78    Hemoglobin 14.0 - 18.0 g/dL   13.8    Hematocrit 40.0 - 54.0 %   41.2    MCV 82 - 98 fL   86    MCH 27.0 - 31.0 pg   28.9    MCHC 32.0 - 36.0 g/dL   33.5    RDW 11.5 - 14.5 %   14.4    Platelets 150 - 450 K/uL   206    MPV 9.2 - 12.9 fL   11.8    INR    1.0    APTT 24.6 - 36.7 sec   29.6         PATHOLOGY:  DIAGNOSIS:   04/26/2024 JAR   1. ORAL CAVITY, ANTERIOR TONGUE LESION, EXCISION   - FIBROMA   2. DENTAL MARGIN   - SCANT FRAGMENTS OF ATYPICAL SQUAMOUS MUCOSA   3. RIGHT ORAL CAVITY LESION, WIDE LOCAL EXCISION   - INVASIVE VERRUCOUS CARCINOMA   - TUMOR APPROACHES DEEP MARGIN OF THE MEDIAL TISSUE EDGE   - TUMOR AT LEAST 5 MM TO ALL REMAINING MARGINS 4. RIGHT MANDIBULAR    TEETH, EXCISION   - TEETH IDENTIFIED GROSSLY   - ADDITIONAL FRAGMENTS OF SQUAMOUS MUCOSA PRESENT   - NEGATIVE FOR TUMOR 5. TOOTH FRAGMENT, EXCISION   - TOOTH IDENTIFIED GROSSLY 6. INTERDENTAL MUCOSA, EXCISION   - SQUAMOUS MUCOSA   - NEGATIVE FOR TUMOR   - NEGATIVE FOR DYSPLASIA     ORAL CAVITY:   PROCEDURE: Wide local excision   TUMOR FOCALITY: Unifocal   TUMOR SITE: Right mandibular gingiva   TUMOR LATERALITY: Right   TUMOR SIZE: 24 mm   TUMOR DEPTH OF INVASION (MM): 2 mm   HISTOLOGIC TYPE: Invasive verrucous carcinoma   HISTOLOGIC GRADE: Well differentiated   MARGINS: Negative   DISTANCE FROM CLOSEST MARGIN (MM): Tumor approaches deep margin of the    medial tissue edge (see parts 4 and 5) Tumor at least 5 mm to all    remainder margins   SPECIFY MARGIN: N/A   PERINEURAL INVASION: Negative   LYMPHOVASCULAR INVASION: Negative   REGIONAL LYMPH NODES: N/A   NUMBER OF LYMPH NODES WITH TUMOR: N/A   LATERALITY OF NODES WITH TUMOR: N/A   SIZE OF LARGEST SHALA METASTATIC DEPOSIT: N/A   EXTRANODAL EXTENSION: N/A   NUMBER OF LYMPH NODES EXAMINED: 0   DISTANT METASTASIS: Not known   TNM CODE: pT2 pN not assigned (no nodes submitted / found)   Tumor at least 5 mm to all remainder margins    _______________________________________________________________________________________________________       3/14/2024:     Abnormal   ORAL CAVITY MASS, BIOPSY:  Atypical verrucous proliferation.  See comment.    Comment:  This is a bulky hyperkeratotic and warty squamous proliferation with dense lichenoid chronic inflammation.  There is a prominent endophytic component with expansive bulbous rete ridges concerning for pushing invasion.  The lesion is  predominantly cytologically bland with only mild atypia confined to the basal layer.  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  However, definitive diagnosis in this particular  setting is difficult without examination of a full excisional specimen. Dr Phil Biswas also reviewed this case and agrees with these findings.    P16 immunostaining is pending.  The results will be issued in a supplemental report.             IMAGING:  N/A    ASSESSMENT AND PLAN:  1. Cancer of oral cavity      Ashwin Thompson is a 69 y.o. male with right oral leukoplakia. High suspicion for dysplasia vs verrucous carcinoma.  - Can increase to soft foods  - Continue mouth rinses after every meal/drink and AM and PM  - Continue good oral hygiene    Follow-up in 2 weeks    Dr. oDdson examined oral cavity as well.

## 2024-05-22 ENCOUNTER — OFFICE VISIT (OUTPATIENT)
Dept: PODIATRY | Facility: CLINIC | Age: 69
End: 2024-05-22
Payer: MEDICARE

## 2024-05-22 VITALS — BODY MASS INDEX: 28.12 KG/M2 | HEIGHT: 70 IN | WEIGHT: 196.44 LBS

## 2024-05-22 DIAGNOSIS — M19.072 ARTHRITIS OF BOTH MIDFEET: Primary | ICD-10-CM

## 2024-05-22 DIAGNOSIS — Q66.221 METATARSUS ADDUCTUS OF BOTH FEET: ICD-10-CM

## 2024-05-22 DIAGNOSIS — M20.11 HALLUX VALGUS OF RIGHT FOOT: ICD-10-CM

## 2024-05-22 DIAGNOSIS — M21.621 TAILOR'S BUNION OF RIGHT FOOT: ICD-10-CM

## 2024-05-22 DIAGNOSIS — M19.071 ARTHRITIS OF BOTH MIDFEET: Primary | ICD-10-CM

## 2024-05-22 DIAGNOSIS — Q66.222 METATARSUS ADDUCTUS OF BOTH FEET: ICD-10-CM

## 2024-05-22 PROCEDURE — 99213 OFFICE O/P EST LOW 20 MIN: CPT | Mod: PBBFAC,PO | Performed by: PODIATRIST

## 2024-05-22 PROCEDURE — 99999 PR PBB SHADOW E&M-EST. PATIENT-LVL III: CPT | Mod: PBBFAC,,, | Performed by: PODIATRIST

## 2024-05-22 PROCEDURE — 99213 OFFICE O/P EST LOW 20 MIN: CPT | Mod: S$PBB,,, | Performed by: PODIATRIST

## 2024-05-22 NOTE — PROGRESS NOTES
Subjective:     Patient ID: Ashwin Thompson is a 69 y.o. male.    Chief Complaint: Foot Pain    Ashwin is a 69 y.o. male with a past medical history of Arthritis, Hyperlipidemia, Hypertension, Hypothyroid, Kidney stone, and Thyroid disease. The patient's chief complaint consists of painful hammertoes and bunion of the Rt. Foot.  Describes deep aching pain in the foot with all weight bearing.  Rates pain currently as a 6/10.  Symptoms are partially alleviated with rest.  Denies sustaining trauma to the limb. Attempts to wear supportive shoe gear with no improvement in symptoms.  Inquires as to how this issue can be resolved.  Denies any additional pedal complaints.      1/17/24: patient was seen as a referral from Dr. Cha for right foot pain needing possible surgical intervention for his bunion and midfoot pain. Since then the patient has started having left foot severe pain and swelling around the Christmas holidays. He was seen by PCP and diagnosed with a gout flare it has calmed with colchicine but still has an occasional pain that starts up.     3/18/24: Patient returns for follow up did get his custom inserts but relates that sometimes his feet are too swollen to wear the new shoes and insert but they do help when wearing them. Pain at the left great toe joint with gout flare ups and right midfoot pain seeking injections today    5/22/24: Patient returns for follow up now able to wear the inserts more as the swelling has gone down, the injections did help and with the inserts he relates there is no pain anymore. Doing well overall.      Hemoglobin A1C   Date Value Ref Range Status   03/23/2022 5.6 0.0 - 5.6 % Final     Comment:     Reference Interval:  5.0 - 5.6 Normal   5.7 - 6.4 High Risk   > 6.5 Diabetic      Hgb A1c results are standardized based on the (NGSP) National   Glycohemoglobin Standardization Program.      Hemoglobin A1C levels are related to mean serum/plasma glucose   during the  preceding 2-3 months.        08/12/2021 5.9 (H) 0.0 - 5.6 % Final     Comment:     Reference Interval:  5.0 - 5.6 Normal   5.7 - 6.4 High Risk   > 6.5 Diabetic      Hgb A1c results are standardized based on the (NGSP) National   Glycohemoglobin Standardization Program.      Hemoglobin A1C levels are related to mean serum/plasma glucose   during the preceding 2-3 months.        08/13/2020 6.0 (H) 0.0 - 5.6 % Final     Comment:     Reference Interval:  5.0 - 5.6 Normal   5.7 - 6.4 High Risk   > 6.5 Diabetic    Hgb A1c results are standardized based on the (NGSP) National   Glycohemoglobin Standardization Program.    Hemoglobin A1C levels are related to mean serum/plasma glucose   during the preceding 2-3 months.            Review of Systems   Constitutional: Negative for chills and fever.   Cardiovascular:  Negative for claudication.   Skin:  Negative for color change and nail changes.   Musculoskeletal:  Negative for joint swelling, muscle cramps and muscle weakness.   Gastrointestinal:  Negative for nausea and vomiting.   Neurological:  Negative for numbness and paresthesias.   Psychiatric/Behavioral:  Negative for altered mental status.         Objective:     Physical Exam  Constitutional:       Appearance: Normal appearance. He is not ill-appearing.   Cardiovascular:      Pulses:           Dorsalis pedis pulses are 2+ on the right side and 2+ on the left side.        Posterior tibial pulses are 2+ on the right side and 2+ on the left side.      Comments: CFT is < 3 seconds bilateral.  Pedal hair growth is present bilateral.  Toes are warm to touch bilateral.    Musculoskeletal:         General: Tenderness and deformity present. No signs of injury.      Right lower leg: No edema.      Left lower leg: No edema.      Comments: Muscle strength 5/5 in all muscle groups bilateral.  No tenderness nor crepitation with ROM of foot/ankle joints bilateral.      No longer has pain with palpation to the bursa overlying the Lt.  Bunion and tailor bunion deformities. No Pain with palpation to met heads 2-5 of the Rt. Foot.  (-) Lachman sign noted.  Semi-rigid hammertoe deformities to digits 2-5 of the Rt. Foot.     Left foot bunion deformity noted with the foot noting edema and mild discoloration to the foot    Right midfoot no longer has pain with palpation   Skin:     General: Skin is warm and dry.      Capillary Refill: Capillary refill takes 2 to 3 seconds.      Findings: No bruising, ecchymosis, erythema, signs of injury, laceration, lesion, petechiae or rash.      Comments: Pedal skin has normal turgor, temperature, and texture bilateral.  Toenails x 10 appear normotrophic. Examination of the skin reveals no evidence of significant maceration, rashes, open lesions, suspicious appearing nevi or other concerning lesions.       Neurological:      Mental Status: He is alert.      Sensory: No sensory deficit.      Motor: No weakness or atrophy.      Comments: Light touch is intact bilateral.             Assessment:      Encounter Diagnoses   Name Primary?    Arthritis of both midfeet Yes    Metatarsus adductus of both feet     Hallux valgus of right foot     Tailor's bunion of right foot            Plan:     Ashwin was seen today for foot pain.    Diagnoses and all orders for this visit:    Arthritis of both midfeet    Metatarsus adductus of both feet    Hallux valgus of right foot    Tailor's bunion of right foot            I counseled the patient on his conditions, their implications and medical management.    Left foot: Gout flares are being controlled with colchicine as needed and he has started allopurinol to bring the uric acid levels down,     Right foot: X-rays were reviewed, and there is noted severe NC joint arthritis with mid arch collapse, bunion formation is noted but with forefoot metatarsus adductus also at the 2-3 metatarsals. Surgery would need to include a metatarsus adductus correction with 1-3 tarsometatarsal fusion as  well as an NC joint fusion. He would be 6 weeks non weight bearing followed by 4-6 more weeks weight bearing in a tall orthopedic boot before getting back to tennis shoes, and will need PT at that point to get back to normal strength and activity. Since he is doing well in the inserts with the injections last appointment I recommend continued use of the custom orthotic inserts    Will return PRN if the pain returns despite the inserts we can consider more injections versus surgical intervention    Marshall Gill DPM

## 2024-05-22 NOTE — PROGRESS NOTES
Note to patients: In accordance with the  Century Cures Act, patients are now granted immediate electronic access to their medical records. This note is primarily intended for communication among medical professionals. As a result, it may incorporate medical terminology, abbreviations, or language that could appear blunt or unfamiliar. If you have questions about this document, we encourage you to discuss it with your physician.      [facility]  [facility]    Patient: Ashwin Thompson    : 1955    MRN: [id number]  Primary Care Provider: Emilee Mcintosh NP  Referring Provider: No ref. provider found   Date of Encounter: 2024    DIAGNOSIS: pending   Cancer Staging   No matching staging information was found for the patient.      CC:   Post-op visit    HPI:   Ashwin Thompson is a 69 y.o. 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 He went to his dentist for routine cleaning in August and noticed he had some white plaques in his mouth that he wanted he referred to ENT. At that time there seemed to be several in multiple locations. He saw Dr. Peterson at [facility] and had a biopsy performed in August which showed squamous mucosa with hyperplasia with basal atypia. He was recommended to have the area ablated with CO2 laser. He was given the option of having the procedure in clinic vs the OR. He didn't feel that he understood completely the reason for the procedure or how to choose anesthetic so he went for a second opinion. He then saw Dr. Tyson (ENT) in [place] and was told it was leukoplakia, but he didn't have the proper equipment for laser ablation.     The lesion is on the right lower gum line. He is having pain. He states his mouth has always been sensitive to spicy food. He is using perioxal which helps some. He reports that it flares up and last for a few days then gets better, but for the past 2 weeks has grown and more  sensitive. Was just on the cheek mucosa, now onto the gums.     Patient denies pain, fever, chills, night sweats, unintentional weight loss, neck mass, enlarged lymph nodes outside of the head or neck, odynophagia, dysphagia, globus sensation, voice changes, cough, hemoptysis, shortness of breath, or new or concerning skin lesions.     Hx of gout and cholesterol problems - going to start immunotherapy for cholesterol soon.    4/15/24: He presents today to review his CT which did not demonstrate any bony involvement. 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    5/1/24: He is here today for his post-op visit. He is having some swelling he states in the inside of him mouth as the surgery site. He is on a full liquid diet. He is using the mouth wash morning and night and as well as anytime he eats or drinks. He is using tylenol 1-2x a day for pain control. He is staying hydrated.     5/9/24: He was having some swelling and pain on Monday but he states it is better today. He is still doing his mouth rinse. He is still on full liquids.    5/23/24: He is doing well. He states he has no concerns but he does have the one area on the left side that we are keeping an eye on it. He states it feels rough which is how it has been. He has a kidney stone and needs a stent and will perform a laser on the left kidney. He wants to make sure it is okay for him to be intubated. He has been having the stone since November, it is a non-obstructing stone. He is wanting to restart nexletol. He is on soft foods and doing good. He is also asking about if he can get implant or bridge in the future.     TREATMENT HISTORY:  4/25/24: Wide local excision with laser, 4 teeth pulled, marginal mandibulectomy       SUBSTANCE USE:  Smoking: Hx of smokeless tobacco - quit 2 years ago  Denies cigarettes, hookah, marijuana, betel nut, illicit drug, heavy cigar, vape use.    ALLERGIES:  Review of patient's allergies indicates:   Allergen Reactions     Felodipine Swelling    Blueberry Diarrhea    Zocor [simvastatin]      Bone and joint pain         MEDICATIONS:    Current Outpatient Medications:     acebutoloL (SECTRAL) 200 MG capsule, Take 200 mg by mouth 2 (two) times daily., Disp: , Rfl:     allopurinoL (ZYLOPRIM) 100 MG tablet, Take 100 mg by mouth 2 (two) times daily., Disp: , Rfl:     aspirin (ECOTRIN) 81 MG EC tablet, Take 81 mg by mouth once daily., Disp: , Rfl:     bempedoic acid (NEXLETOL) 180 mg Tab, , Disp: , Rfl:     cephALEXin (KEFLEX) 500 MG capsule, Take 500 mg by mouth 3 (three) times daily., Disp: , Rfl:     colchicine (COLCRYS) 0.6 mg tablet, Take by mouth as needed., Disp: , Rfl:     hydroCHLOROthiazide (HYDRODIURIL) 25 MG tablet, Take 1 tablet (25 mg total) by mouth once daily., Disp: 30 tablet, Rfl: 11    levothyroxine (SYNTHROID) 75 MCG tablet, Take 1 tablet (75 mcg total) by mouth once daily., Disp: 90 tablet, Rfl: 1    NIFEdipine (ADALAT CC) 30 MG TbSR, Take 1 tablet (30 mg total) by mouth once daily. (Patient taking differently: Take 30 mg by mouth every evening.), Disp: 90 tablet, Rfl: 3    olmesartan (BENICAR) 40 MG tablet, Take 1 tablet (40 mg total) by mouth once daily., Disp: 90 tablet, Rfl: 3    oxyCODONE (ROXICODONE) 5 MG immediate release tablet, Take 1 tablet (5 mg total) by mouth every 4 (four) hours as needed for Pain., Disp: 20 tablet, Rfl: 0    potassium citrate (UROCIT-K) 5 mEq (540 mg) TbSR, Take 2 tablets (10 mEq total) by mouth 3 (three) times daily with meals., Disp: 180 tablet, Rfl: 11    terazosin (HYTRIN) 5 MG capsule, Take 1 capsule (5 mg total) by mouth every evening. (Patient taking differently: Take 5 mg by mouth once daily.), Disp: 30 capsule, Rfl: 11    vitamin D (VITAMIN D3) 1000 units Tab, Take 1,000 Units by mouth once daily., Disp: , Rfl:     vitamin K2 100 mcg Cap, Take by mouth., Disp: , Rfl:     OTHER HISTORY  Past medical, surgical, family, and social histories have been updated and reviewed as needed  "since last visit.     See above substance history    REVIEW OF SYSTEMS:   Comprehensive review of systems was discussed with the patient.  It is positive only for the above complaints.    PHYSICAL EXAMINATION:  Blood pressure (!) 146/76, pulse 89, temperature 97.3 °F (36.3 °C), temperature source Temporal, resp. rate 16, height 5' 10" (1.778 m), weight 91.2 kg (201 lb 1 oz), SpO2 96%.    Constitutional: Non-toxic appearing.   Psychiatric: Appropriate mood and affect. Cooperative.  Voice: Non-dysphonic, speaking in full sentences.   Neurologic: Cranial nerves grossly intact, no focal deficits.  Head and face: Salivary glands are not enlarged. Face is symmetric. CN VII strength intact.  Skin: No concerning skin lesions.   Eyes: Vision grossly intact, bilateral extraocular movements intact  Ears: Bilateral pinna, mastoid, external ear canal normal. Hearing intact.   Nose: External nose appears normal.   Lips: No ulcers or lesions  Oral cavity: sutures intact and seom removed today, small area of bone exposure on posterior lingual side still present. Surgery site healing well. Mucosa is pink and moist. Buccal mucosa, gingiva, anterior tongue, floor of mouth, and hard palate appear normal. Leukoplakia b/t teeth 20&19.   Oropharynx: Mucosa is pink and moist. Soft palate and base of tongue are normal. Posterior pharyngeal wall normal. Tonsils are normal. No lesions.  Neck: Soft and flat,  no masses or lymphadenopathy. No palpable thyroid enlargement or nodules.  Respiratory: Chest expansion symmetric, no audible stridor or stertor. Breathing is unlabored. No active cough.    CLINICAL PHOTOS:  Post-op visit 5/1/24:      Area of leukoplakia that we will be monitoring    From Dr. Bolton's visit:    DATA REVIEWED:     LABORATORY:      Latest Ref Rng & Units 12/20/2023     8:25 AM 4/3/2024    12:57 PM 4/17/2024    10:43 AM   Thyroid Labs   Sodium 136 - 145 mmol/L 139   137    Potassium 3.5 - 5.1 mmol/L 4.2   3.9    Chloride 95 " - 110 mmol/L 100   103    Carbon Dioxide 22 - 31 mmol/L 29   26    Glucose 70 - 110 mg/dL 113   107    Blood Urea Nitrogen 9 - 21 mg/dL 27   25    Creatinine 0.50 - 1.40 mg/dL 1.49  1.3  1.29    Calcium 8.4 - 10.2 mg/dL 9.7   10.3    Albumin 3.5 - 5.2 g/dL 4.6      Anion Gap 5 - 12 mmol/L 10   8    WBC 3.90 - 12.70 K/uL   5.11    RBC 4.60 - 6.20 M/uL   4.78    Hemoglobin 14.0 - 18.0 g/dL   13.8    Hematocrit 40.0 - 54.0 %   41.2    MCV 82 - 98 fL   86    MCH 27.0 - 31.0 pg   28.9    MCHC 32.0 - 36.0 g/dL   33.5    RDW 11.5 - 14.5 %   14.4    Platelets 150 - 450 K/uL   206    MPV 9.2 - 12.9 fL   11.8    INR    1.0    APTT 24.6 - 36.7 sec   29.6         PATHOLOGY:  DIAGNOSIS:   04/26/2024 JAR   1. ORAL CAVITY, ANTERIOR TONGUE LESION, EXCISION   - FIBROMA   2. DENTAL MARGIN   - SCANT FRAGMENTS OF ATYPICAL SQUAMOUS MUCOSA   3. RIGHT ORAL CAVITY LESION, WIDE LOCAL EXCISION   - INVASIVE VERRUCOUS CARCINOMA   - TUMOR APPROACHES DEEP MARGIN OF THE MEDIAL TISSUE EDGE   - TUMOR AT LEAST 5 MM TO ALL REMAINING MARGINS 4. RIGHT MANDIBULAR    TEETH, EXCISION   - TEETH IDENTIFIED GROSSLY   - ADDITIONAL FRAGMENTS OF SQUAMOUS MUCOSA PRESENT   - NEGATIVE FOR TUMOR 5. TOOTH FRAGMENT, EXCISION   - TOOTH IDENTIFIED GROSSLY 6. INTERDENTAL MUCOSA, EXCISION   - SQUAMOUS MUCOSA   - NEGATIVE FOR TUMOR   - NEGATIVE FOR DYSPLASIA     ORAL CAVITY:   PROCEDURE: Wide local excision   TUMOR FOCALITY: Unifocal   TUMOR SITE: Right mandibular gingiva   TUMOR LATERALITY: Right   TUMOR SIZE: 24 mm   TUMOR DEPTH OF INVASION (MM): 2 mm   HISTOLOGIC TYPE: Invasive verrucous carcinoma   HISTOLOGIC GRADE: Well differentiated   MARGINS: Negative   DISTANCE FROM CLOSEST MARGIN (MM): Tumor approaches deep margin of the    medial tissue edge (see parts 4 and 5) Tumor at least 5 mm to all    remainder margins   SPECIFY MARGIN: N/A   PERINEURAL INVASION: Negative   LYMPHOVASCULAR INVASION: Negative   REGIONAL LYMPH NODES: N/A   NUMBER OF LYMPH NODES WITH TUMOR:  N/A   LATERALITY OF NODES WITH TUMOR: N/A   SIZE OF LARGEST SHALA METASTATIC DEPOSIT: N/A   EXTRANODAL EXTENSION: N/A   NUMBER OF LYMPH NODES EXAMINED: 0   DISTANT METASTASIS: Not known   TNM CODE: pT2 pN not assigned (no nodes submitted / found)   Tumor at least 5 mm to all remainder margins   _______________________________________________________________________________________________________       3/14/2024:     Abnormal   ORAL CAVITY MASS, BIOPSY:  Atypical verrucous proliferation.  See comment.    Comment:  This is a bulky hyperkeratotic and warty squamous proliferation with dense lichenoid chronic inflammation.  There is a prominent endophytic component with expansive bulbous rete ridges concerning for pushing invasion.  The lesion is  predominantly cytologically bland with only mild atypia confined to the basal layer.  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  However, definitive diagnosis in this particular  setting is difficult without examination of a full excisional specimen. Dr Phil Biswas also reviewed this case and agrees with these findings.    P16 immunostaining is pending.  The results will be issued in a supplemental report.             IMAGING:  N/A    ASSESSMENT AND PLAN:  1. Acquired dysplasia of oral cavity    2. Cancer of oral cavity        Ashwin Thompson is a 69 y.o. male with right oral leukoplakia. High suspicion for dysplasia vs verrucous carcinoma.  - Can advance diet since healing well, make sure to perform good oral hygiene   - Continue mouth rinses after every meal/drink and AM and PM  - Okay to start nexletal   - Okay for intubation  - May use dentures in the future but no implant until at least 5 years      Follow-up in 3 months

## 2024-05-23 ENCOUNTER — OFFICE VISIT (OUTPATIENT)
Dept: HEMATOLOGY/ONCOLOGY | Facility: CLINIC | Age: 69
End: 2024-05-23
Payer: MEDICARE

## 2024-05-23 VITALS
SYSTOLIC BLOOD PRESSURE: 146 MMHG | WEIGHT: 201.06 LBS | HEIGHT: 70 IN | BODY MASS INDEX: 28.78 KG/M2 | HEART RATE: 89 BPM | OXYGEN SATURATION: 96 % | DIASTOLIC BLOOD PRESSURE: 76 MMHG | RESPIRATION RATE: 16 BRPM | TEMPERATURE: 97 F

## 2024-05-23 DIAGNOSIS — K13.79 ACQUIRED DYSPLASIA OF ORAL CAVITY: Primary | ICD-10-CM

## 2024-05-23 DIAGNOSIS — C06.9 CANCER OF ORAL CAVITY: ICD-10-CM

## 2024-05-23 PROCEDURE — 99024 POSTOP FOLLOW-UP VISIT: CPT | Mod: POP,,, | Performed by: NURSE PRACTITIONER

## 2024-05-23 PROCEDURE — 99214 OFFICE O/P EST MOD 30 MIN: CPT | Mod: PBBFAC,PN | Performed by: NURSE PRACTITIONER

## 2024-05-23 PROCEDURE — 99999 PR PBB SHADOW E&M-EST. PATIENT-LVL IV: CPT | Mod: PBBFAC,,, | Performed by: NURSE PRACTITIONER

## 2024-05-28 ENCOUNTER — DOCUMENTATION ONLY (OUTPATIENT)
Dept: HEMATOLOGY/ONCOLOGY | Facility: CLINIC | Age: 69
End: 2024-05-28
Payer: MEDICARE

## 2024-07-08 PROBLEM — N20.0 RENAL STONE: Status: ACTIVE | Noted: 2024-07-08

## 2024-08-21 NOTE — PROGRESS NOTES
Note to patients: In accordance with the  Century Cures Act, patients are now granted immediate electronic access to their medical records. This note is primarily intended for communication among medical professionals. As a result, it may incorporate medical terminology, abbreviations, or language that could appear blunt or unfamiliar. If you have questions about this document, we encourage you to discuss it with your physician.      [facility]  [facility]    Patient: Ashwin Thompson    : 1955    MRN: [id number]  Primary Care Provider: Emilee Mcintosh NP  Referring Provider: No ref. provider found   Date of Encounter: 2024    DIAGNOSIS: pending   Cancer Staging   No matching staging information was found for the patient.      CC:   Post-op visit    HPI:   Ashwin Thompson is a 69 y.o. 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 He went to his dentist for routine cleaning in August and noticed he had some white plaques in his mouth that he wanted he referred to ENT. At that time there seemed to be several in multiple locations. He saw Dr. Peterson at [facility] and had a biopsy performed in August which showed squamous mucosa with hyperplasia with basal atypia. He was recommended to have the area ablated with CO2 laser. He was given the option of having the procedure in clinic vs the OR. He didn't feel that he understood completely the reason for the procedure or how to choose anesthetic so he went for a second opinion. He then saw Dr. Tyson (ENT) in [place] and was told it was leukoplakia, but he didn't have the proper equipment for laser ablation.     The lesion is on the right lower gum line. He is having pain. He states his mouth has always been sensitive to spicy food. He is using perioxal which helps some. He reports that it flares up and last for a few days then gets better, but for the past 2 weeks has grown and more  sensitive. Was just on the cheek mucosa, now onto the gums.     Patient denies pain, fever, chills, night sweats, unintentional weight loss, neck mass, enlarged lymph nodes outside of the head or neck, odynophagia, dysphagia, globus sensation, voice changes, cough, hemoptysis, shortness of breath, or new or concerning skin lesions.     Hx of gout and cholesterol problems - going to start immunotherapy for cholesterol soon.    4/15/24: He presents today to review his CT which did not demonstrate any bony involvement. 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    5/1/24: He is here today for his post-op visit. He is having some swelling he states in the inside of him mouth as the surgery site. He is on a full liquid diet. He is using the mouth wash morning and night and as well as anytime he eats or drinks. He is using tylenol 1-2x a day for pain control. He is staying hydrated.     5/9/24: He was having some swelling and pain on Monday but he states it is better today. He is still doing his mouth rinse. He is still on full liquids.    5/23/24: He is doing well. He states he has no concerns but he does have the one area on the left side that we are keeping an eye on it. He states it feels rough which is how it has been. He has a kidney stone and needs a stent and will perform a laser on the left kidney. He wants to make sure it is okay for him to be intubated. He has been having the stone since November, it is a non-obstructing stone. He is wanting to restart nexletol. He is on soft foods and doing good. He is also asking about if he can get implant or bridge in the future.     8/22/24: He noticed on July 15th he started with a bump that was painless at first on his right inside of his lip. He states 2 Saturdays ago it started with pain and he was able to get out clear thick fluid. He states since then it has almost resolved. He never had any further drainage since then. He denies any pain. He denies any other  concerns. He still has numbness to his right lower lip since surgery that may be getting better.     TREATMENT HISTORY:  4/25/24: Wide local excision with laser, 4 teeth pulled, marginal mandibulectomy     SUBSTANCE USE:  Smoking: Hx of smokeless tobacco - quit 2 years ago  Denies cigarettes, hookah, marijuana, betel nut, illicit drug, heavy cigar, vape use.    ALLERGIES:  Review of patient's allergies indicates:   Allergen Reactions    Felodipine Swelling    Blueberry Diarrhea    Zocor [simvastatin]      Bone and joint pain         MEDICATIONS:    Current Outpatient Medications:     acebutoloL (SECTRAL) 200 MG capsule, Take 200 mg by mouth 2 (two) times daily., Disp: , Rfl:     allopurinoL (ZYLOPRIM) 100 MG tablet, Take 300 mg by mouth 2 (two) times daily., Disp: , Rfl:     aspirin (ECOTRIN) 81 MG EC tablet, Take 81 mg by mouth once daily., Disp: , Rfl:     bempedoic acid (NEXLETOL) 180 mg Tab, once daily., Disp: , Rfl:     hydroCHLOROthiazide (HYDRODIURIL) 25 MG tablet, Take 1 tablet (25 mg total) by mouth once daily., Disp: 30 tablet, Rfl: 11    levothyroxine (SYNTHROID) 75 MCG tablet, Take 1 tablet (75 mcg total) by mouth once daily., Disp: 90 tablet, Rfl: 1    magnesium oxide (MAG-OX) 200 MG tablet, 1 tablet every day by oral route., Disp: , Rfl:     NIFEdipine (ADALAT CC) 30 MG TbSR, Take 1 tablet (30 mg total) by mouth once daily. (Patient taking differently: Take 30 mg by mouth every evening.), Disp: 90 tablet, Rfl: 3    olmesartan (BENICAR) 40 MG tablet, Take 1 tablet (40 mg total) by mouth once daily., Disp: 90 tablet, Rfl: 3    terazosin (HYTRIN) 5 MG capsule, Take 1 capsule (5 mg total) by mouth every evening. (Patient taking differently: Take 5 mg by mouth once daily.), Disp: 30 capsule, Rfl: 11    vitamin D (VITAMIN D3) 1000 units Tab, Take 1,000 Units by mouth once daily., Disp: , Rfl:     vitamin K2 100 mcg Cap, Take by mouth., Disp: , Rfl:     HYDROcodone-acetaminophen (NORCO) 5-325 mg per tablet,  "Take 1 tablet by mouth every 6 (six) hours as needed for Pain. (Patient not taking: Reported on 8/22/2024), Disp: 11 tablet, Rfl: 0    OTHER HISTORY  Past medical, surgical, family, and social histories have been updated and reviewed as needed since last visit.     See above substance history    REVIEW OF SYSTEMS:   Comprehensive review of systems was discussed with the patient.  It is positive only for the above complaints.    PHYSICAL EXAMINATION:  Blood pressure 131/67, pulse (!) 56, temperature 97.9 °F (36.6 °C), temperature source Temporal, resp. rate 16, height 5' 10" (1.778 m), weight 93.7 kg (206 lb 9.1 oz), SpO2 96%.    Constitutional: Non-toxic appearing.   Psychiatric: Appropriate mood and affect. Cooperative.  Voice: Non-dysphonic, speaking in full sentences.   Neurologic: Cranial nerves grossly intact, no focal deficits.  Head and face: Salivary glands are not enlarged. Face is symmetric. CN VII strength intact.  Skin: No concerning skin lesions.   Eyes: Vision grossly intact, bilateral extraocular movements intact  Ears: Bilateral pinna, mastoid, external ear canal normal. Hearing intact.   Nose: External nose appears normal.   Lips: No ulcers or lesions  Oral cavity: small area of bone exposure on posterior lingual side resolved. Surgery site healed. Small area of bone exposure on anterior mandible.  Mucosa is pink and moist. Buccal mucosa, gingiva, anterior tongue, floor of mouth, and hard palate appear normal. Leukoplakia b/t teeth 20&19.  Oropharynx: Mucosa is pink and moist. Soft palate and base of tongue are normal. Posterior pharyngeal wall normal. Tonsils are normal. No lesions.  Neck: Soft and flat,  no masses or lymphadenopathy. No palpable thyroid enlargement or nodules.  Respiratory: Chest expansion symmetric, no audible stridor or stertor. Breathing is unlabored. No active cough.    CLINICAL PHOTOS:  Post-op visit 5/1/24:      Area of leukoplakia that we will be monitoring    From " Che's visit:    DATA REVIEWED:     LABORATORY:      Latest Ref Rng & Units 4/3/2024    12:57 PM 4/17/2024    10:43 AM 7/8/2024     7:27 AM   Thyroid Labs   Sodium 136 - 145 mmol/L  137  139    Potassium 3.5 - 5.1 mmol/L  3.9  3.8    Chloride 95 - 110 mmol/L  103  107    Carbon Dioxide 22 - 31 mmol/L  26  22    Glucose 70 - 110 mg/dL  107  116    Blood Urea Nitrogen 9 - 21 mg/dL  25  44    Creatinine 0.50 - 1.40 mg/dL 1.3  1.29  1.60    Calcium 8.4 - 10.2 mg/dL  10.3  10.0    Anion Gap 5 - 12 mmol/L  8  10    WBC 3.90 - 12.70 K/uL  5.11  5.86    RBC 4.60 - 6.20 M/uL  4.78  4.62    Hemoglobin 14.0 - 18.0 g/dL  13.8  13.6    Hematocrit 40.0 - 54.0 %  41.2  40.5    MCV 82 - 98 fL  86  88    MCH 27.0 - 31.0 pg  28.9  29.4    MCHC 32.0 - 36.0 g/dL  33.5  33.6    RDW 11.5 - 14.5 %  14.4  14.6    Platelets 150 - 450 K/uL  206  200    MPV 9.2 - 12.9 fL  11.8  11.5    INR   1.0  1.0    APTT 24.6 - 36.7 sec  29.6  29.0         PATHOLOGY:  DIAGNOSIS:   04/26/2024 JAR   1. ORAL CAVITY, ANTERIOR TONGUE LESION, EXCISION   - FIBROMA   2. DENTAL MARGIN   - SCANT FRAGMENTS OF ATYPICAL SQUAMOUS MUCOSA   3. RIGHT ORAL CAVITY LESION, WIDE LOCAL EXCISION   - INVASIVE VERRUCOUS CARCINOMA   - TUMOR APPROACHES DEEP MARGIN OF THE MEDIAL TISSUE EDGE   - TUMOR AT LEAST 5 MM TO ALL REMAINING MARGINS 4. RIGHT MANDIBULAR    TEETH, EXCISION   - TEETH IDENTIFIED GROSSLY   - ADDITIONAL FRAGMENTS OF SQUAMOUS MUCOSA PRESENT   - NEGATIVE FOR TUMOR 5. TOOTH FRAGMENT, EXCISION   - TOOTH IDENTIFIED GROSSLY 6. INTERDENTAL MUCOSA, EXCISION   - SQUAMOUS MUCOSA   - NEGATIVE FOR TUMOR   - NEGATIVE FOR DYSPLASIA     ORAL CAVITY:   PROCEDURE: Wide local excision   TUMOR FOCALITY: Unifocal   TUMOR SITE: Right mandibular gingiva   TUMOR LATERALITY: Right   TUMOR SIZE: 24 mm   TUMOR DEPTH OF INVASION (MM): 2 mm   HISTOLOGIC TYPE: Invasive verrucous carcinoma   HISTOLOGIC GRADE: Well differentiated   MARGINS: Negative   DISTANCE FROM CLOSEST MARGIN (MM): Tumor  approaches deep margin of the    medial tissue edge (see parts 4 and 5) Tumor at least 5 mm to all    remainder margins   SPECIFY MARGIN: N/A   PERINEURAL INVASION: Negative   LYMPHOVASCULAR INVASION: Negative   REGIONAL LYMPH NODES: N/A   NUMBER OF LYMPH NODES WITH TUMOR: N/A   LATERALITY OF NODES WITH TUMOR: N/A   SIZE OF LARGEST SHALA METASTATIC DEPOSIT: N/A   EXTRANODAL EXTENSION: N/A   NUMBER OF LYMPH NODES EXAMINED: 0   DISTANT METASTASIS: Not known   TNM CODE: pT2 pN not assigned (no nodes submitted / found)   Tumor at least 5 mm to all remainder margins   _______________________________________________________________________________________________________       3/14/2024:     Abnormal   ORAL CAVITY MASS, BIOPSY:  Atypical verrucous proliferation.  See comment.    Comment:  This is a bulky hyperkeratotic and warty squamous proliferation with dense lichenoid chronic inflammation.  There is a prominent endophytic component with expansive bulbous rete ridges concerning for pushing invasion.  The lesion is  predominantly cytologically bland with only mild atypia confined to the basal layer.  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  However, definitive diagnosis in this particular  setting is difficult without examination of a full excisional specimen. Dr Phil Biswas also reviewed this case and agrees with these findings.    P16 immunostaining is pending.  The results will be issued in a supplemental report.             IMAGING:  N/A    ASSESSMENT AND PLAN:  1. Acquired dysplasia of oral cavity    - new area of bone exposure on right side of anterior mandible. Will continue to watch for now.       Ashwin Thompson is a 69 y.o. male with right oral leukoplakia. High suspicion for dysplasia vs verrucous carcinoma.  - Continue dental visits  - May use dentures in the future but no implant until at least 5 years    - Call with any areas  of concern    Follow-up in 3 months    Patient also examined by Dr. Dodson

## 2024-08-22 ENCOUNTER — OFFICE VISIT (OUTPATIENT)
Dept: HEMATOLOGY/ONCOLOGY | Facility: CLINIC | Age: 69
End: 2024-08-22
Payer: MEDICARE

## 2024-08-22 VITALS
RESPIRATION RATE: 16 BRPM | SYSTOLIC BLOOD PRESSURE: 131 MMHG | TEMPERATURE: 98 F | HEART RATE: 56 BPM | WEIGHT: 206.56 LBS | OXYGEN SATURATION: 96 % | DIASTOLIC BLOOD PRESSURE: 67 MMHG | BODY MASS INDEX: 29.57 KG/M2 | HEIGHT: 70 IN

## 2024-08-22 DIAGNOSIS — K13.79 ACQUIRED DYSPLASIA OF ORAL CAVITY: Primary | ICD-10-CM

## 2024-08-22 PROCEDURE — 99999 PR PBB SHADOW E&M-EST. PATIENT-LVL IV: CPT | Mod: PBBFAC,,, | Performed by: NURSE PRACTITIONER

## 2024-08-22 PROCEDURE — 99214 OFFICE O/P EST MOD 30 MIN: CPT | Mod: PBBFAC,PN | Performed by: NURSE PRACTITIONER

## 2024-08-22 PROCEDURE — 99213 OFFICE O/P EST LOW 20 MIN: CPT | Mod: S$PBB,,, | Performed by: NURSE PRACTITIONER

## 2024-09-18 ENCOUNTER — TELEPHONE (OUTPATIENT)
Dept: HEMATOLOGY/ONCOLOGY | Facility: CLINIC | Age: 69
End: 2024-09-18
Payer: MEDICARE

## 2024-09-18 NOTE — TELEPHONE ENCOUNTER
Notified pt of Dr Dodson's recommendation that he may have remaining root tip removed by dentist or oral surgeon.    Pt stated that he had his molar removed today and is not having any discomfort at present.

## 2024-09-18 NOTE — TELEPHONE ENCOUNTER
Pt's Right bottom wisdom tooth oozed for 6 hours last night and is loose.  Pt has appt with Papo Walter DDS today to have tooth removed.  Pt also has a retained root tip that Dr Walter is advising pt see an oral surgeon for removal.    Pt wants to know what Dr Dodson suggest-  would Dr Dodson like to do the surgery or does she recommend an oral surgeon.  ----- Message from Cynthia Ordoñez, Patient Care Assistant sent at 9/18/2024  8:25 AM CDT -----  Type: Needs Medical Advice  Who Called:  neha  Vince Call Back Number: [phone]    Additional Information: neha states he would like a call back about problems he is having with tooth , please call to further discuss thank you.

## 2024-10-17 ENCOUNTER — HOSPITAL ENCOUNTER (OUTPATIENT)
Dept: RADIOLOGY | Facility: HOSPITAL | Age: 69
Discharge: HOME | End: 2024-10-17
Attending: PHYSICIAN ASSISTANT
Payer: MEDICARE

## 2024-10-17 ENCOUNTER — OFFICE VISIT (OUTPATIENT)
Dept: PAIN MEDICINE | Facility: CLINIC | Age: 69
End: 2024-10-17
Payer: MEDICARE

## 2024-10-17 VITALS
BODY MASS INDEX: 30.21 KG/M2 | HEART RATE: 55 BPM | DIASTOLIC BLOOD PRESSURE: 59 MMHG | SYSTOLIC BLOOD PRESSURE: 111 MMHG | WEIGHT: 210.56 LBS

## 2024-10-17 DIAGNOSIS — M25.552 BILATERAL HIP PAIN: ICD-10-CM

## 2024-10-17 DIAGNOSIS — M25.551 BILATERAL HIP PAIN: ICD-10-CM

## 2024-10-17 DIAGNOSIS — M54.50 LUMBAR BACK PAIN: Primary | ICD-10-CM

## 2024-10-17 DIAGNOSIS — M54.16 LUMBAR RADICULOPATHY: ICD-10-CM

## 2024-10-17 DIAGNOSIS — M54.50 LUMBAR BACK PAIN: ICD-10-CM

## 2024-10-17 PROCEDURE — 72114 X-RAY EXAM L-S SPINE BENDING: CPT | Mod: TC,PO

## 2024-10-17 PROCEDURE — 73521 X-RAY EXAM HIPS BI 2 VIEWS: CPT | Mod: 26,,, | Performed by: RADIOLOGY

## 2024-10-17 PROCEDURE — 99999 PR PBB SHADOW E&M-EST. PATIENT-LVL V: CPT | Mod: PBBFAC,,, | Performed by: PHYSICIAN ASSISTANT

## 2024-10-17 PROCEDURE — 99215 OFFICE O/P EST HI 40 MIN: CPT | Mod: PBBFAC,PO,25 | Performed by: PHYSICIAN ASSISTANT

## 2024-10-17 PROCEDURE — 73521 X-RAY EXAM HIPS BI 2 VIEWS: CPT | Mod: TC,PO

## 2024-10-17 PROCEDURE — 72114 X-RAY EXAM L-S SPINE BENDING: CPT | Mod: 26,,, | Performed by: RADIOLOGY

## 2024-10-17 PROCEDURE — 99203 OFFICE O/P NEW LOW 30 MIN: CPT | Mod: S$PBB,,, | Performed by: PHYSICIAN ASSISTANT

## 2024-10-17 RX ORDER — METHYLPREDNISOLONE 4 MG/1
TABLET ORAL
Qty: 1 EACH | Refills: 0 | Status: SHIPPED | OUTPATIENT
Start: 2024-10-17 | End: 2024-11-07

## 2024-10-17 NOTE — PATIENT INSTRUCTIONS
Patients Guide to Back Pain    Low back pain effects 2/3 of adults at some point in their lives.  It is one of the top 10 reasons to go to the doctor.  Back pain is scary, sudden, and painful but does usually improve.  It is usually benign and is not caused by a serious underlying disease.  95% of back pain is mechanical, meaning something makes it worse (most commonly bending and sitting).    If bending makes it worse, extension stretches can be helpful.    If standing makes it worse flexion, stretches and z-lie can be helpful.   You can find details on these stretches and more in Treat Your Own Back by Frankie Ko.    Moving is the best medicine, even if it hurts. Bed rest is not recommended for back pain.  Early return to daily activities leads to less chronic disability.  Early involvement in Physical Therapy can decrease the likelihood of acute low back pain becoming chronic.1  Back pain is a complex issue to diagnose. A definite diagnosis for back pain cannot be made for nearly 85% of patients at the initial visit.   Medicine such as muscle relaxers and anti-inflammatories can be helpful. Narcotic pain medicine is not recommended for back pain.    Imaging Guidance:  Imaging such as MRIs and X-rays are not necessary in the beginning and do not influence treatment or influence the course of acute back pain. Degenerative changes such as disc bulges and arthritis are common in people as young as 18 and with no pain.  In people under 50 with no signs and symptoms of systemic disease no imaging is needed.2  Conservative care for 6 weeks is recommended for patients with or without shooting nerve pain in arms/legs prior to MRI.2  Imaging of patients with low back pain without indications of serious underlying conditions does not improve outcomes.3  RESOURCES  [facility] website:  [email or web address]  [facility]  website:  [email or web address]/[email or web address]  Books:  Treat Your Own Back by Frankie Ko  Treat Your Own Neck by Frankie Ko  KEY TAKEAWAYS:  Moving is recommended. Bed rest is not recommended.  Seek Physical Therapy as early as possible.  Acute flares will improve.    1WWally vines et al. Nonpharmacologic options for treating acute and chronic pain. PMR journal 7 2015) [id number]  2JLuis berrios and Akira Magana. Diagnostic evaluation of low back pain with emphasis on imaging. Annals of internal medicine 2002; 137:586-597  3CBrendan urban Rongwei Fu, Akira Tinajero. Imaging studies for low back pain: systemic review and meta-analysis. Lancet 2009;373 463-48

## 2024-10-17 NOTE — PROGRESS NOTES
[facility] New Patient Evaluation      Referred by: Emilee Mcintosh/ self referral    PCP:   Emilee Mcintosh NP    CC:   Chief Complaint   Patient presents with    Low-back Pain          HPI:   Ashwin Thompson is a 69 y.o. year old male patient who has a past medical history of Arthritis, Cancer, Gout, unspecified, Hyperlipidemia, Hypertension, Hypothyroid, Kidney stone, and Thyroid disease. He presents in referral from Emilee Mcintosh/ self referral for lower back pain. He has felt occasional back pain over the last few years with different activities that would typically resolve quickly with rest.  Current pain started about 1 week ago after moving flower pots and picking up fence posts.  Pain has been felt in the left lower back and left posterior leg to the foot.  Although he has not had any left leg pain today. Pain tends to be worse at night with sleeping and does ease after moving around in the morning.  He has also felt pain in the bilateral buttock area to the hip with walking that eases with sitting or use of inversion table.      Denies bowel/ bladder incontinence.    Past and current medications:  Antineuropathics:  NSAIDs:  Antidepressants:  Muscle relaxers:  Opioids: norco  Antiplatelets/Anticoagulants: ASA    Physical Therapy/ Chiropractic care:  Home  inversion table; no other treatments tried.    Pain Intervention History:  none    Past Spine Surgical History:  none        History:    Current Outpatient Medications:     acebutoloL (SECTRAL) 200 MG capsule, Take 200 mg by mouth 2 (two) times daily., Disp: , Rfl:     allopurinoL (ZYLOPRIM) 100 MG tablet, Take 300 mg by mouth 2 (two) times daily., Disp: , Rfl:     aspirin (ECOTRIN) 81 MG EC tablet, Take 81 mg by mouth once daily., Disp: , Rfl:     bempedoic acid (NEXLETOL) 180 mg Tab, once daily., Disp: , Rfl:     hydroCHLOROthiazide (HYDRODIURIL) 25 MG tablet, Take 1 tablet (25 mg total) by mouth once daily., Disp: 30 tablet,  Rfl: 11    levothyroxine (SYNTHROID) 75 MCG tablet, Take 1 tablet (75 mcg total) by mouth once daily., Disp: 90 tablet, Rfl: 1    magnesium oxide (MAG-OX) 200 MG tablet, 1 tablet every day by oral route., Disp: , Rfl:     NIFEdipine (ADALAT CC) 30 MG TbSR, Take 1 tablet (30 mg total) by mouth once daily. (Patient taking differently: Take 30 mg by mouth every evening.), Disp: 90 tablet, Rfl: 3    olmesartan (BENICAR) 40 MG tablet, Take 1 tablet (40 mg total) by mouth once daily., Disp: 90 tablet, Rfl: 3    terazosin (HYTRIN) 5 MG capsule, Take 1 capsule (5 mg total) by mouth every evening. 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    Gout, unspecified     Hyperlipidemia     Hypertension     Hypothyroid     Kidney stone     stage 3 CKD    Thyroid disease        Past Surgical History:   Procedure Laterality Date    CYSTOSCOPY WITH CALCULUS EXTRACTION Left 7/8/2024    Procedure: CYSTOSCOPY, WITH CALCULUS REMOVAL;  Surgeon: Talon Moy MD;  Location: [facility] OR;  Service: Urology;  Laterality: Left;    CYSTOURETEROSCOPY WITH RETROGRADE PYELOGRAPHY AND INSERTION OF STENT INTO URETER Left 7/8/2024    Procedure: CYSTOURETEROSCOPY, WITH RETROGRADE PYELOGRAM AND URETERAL STENT INSERTION;  Surgeon: Talon Moy MD;  Location: [facility] OR;  Service: Urology;  Laterality: Left;    EXTRACTION OF TOOTH Right 4/24/2024    Procedure: EXTRACTION, TOOTH;  Surgeon: Anneliese Dodson MD;  Location: [facility] OR;  Service: ENT;  Laterality: Right;    GANGLION CYST EXCISION  03/09/2020    LASER LITHOTRIPSY Left 7/8/2024    Procedure: LITHOTRIPSY, USING  LASER, Thulium;  Surgeon: Talon Moy MD;  Location: [facility] OR;  Service: Urology;  Laterality: Left;    PARTIAL SURGICAL REMOVAL OF MANDIBLE Right 2024    Procedure: MANDIBULECTOMY, PARTIAL;  Surgeon: Anneliese Dodson MD;  Location: [facility] OR;  Service: ENT;  Laterality: Right;    SURGICAL REMOVAL OF NEOPLASM OF MOUTH Right 2024    Procedure: EXCISION, NEOPLASM, MOUTH;  Surgeon: Anneliese Dodson MD;  Location: [facility] OR;  Service: ENT;  Laterality: Right;    TONSILLECTOMY         Family History   Problem Relation Name Age of Onset    Hypertension Mother      Stroke Mother           at 78    Emphysema Father      COPD Father      Pneumonia Father      Heart disease Brother      Arthritis Brother         Social History     Socioeconomic History    Marital status:    Occupational History    Occupation: air conditioning   Tobacco Use    Smoking status: Never    Smokeless tobacco: Former     Types: Chew    Tobacco comments:     2019 - states is weaning use of chew      stopped chew   Substance and Sexual Activity    Alcohol use: Not Currently    Drug use: No    Sexual activity: Yes     Partners: Female     Birth control/protection: Surgical     Social Drivers of Health     Financial Resource Strain: Low Risk  (3/11/2024)    Overall Financial Resource Strain (CARDIA)     Difficulty of Paying Living Expenses: Not hard at all   Food Insecurity: No Food Insecurity (3/11/2024)    Hunger Vital Sign     Worried About Running Out of Food in the Last Year: Never true     Ran Out of Food in the Last Year: Never true   Transportation Needs: No Transportation Needs (3/11/2024)    PRAPARE - Transportation     Lack of Transportation (Medical): No     Lack of Transportation (Non-Medical): No   Physical Activity: Sufficiently Active (3/11/2024)    Exercise Vital Sign     Days of Exercise per Week: 3 days     Minutes of Exercise per Session: 150+ min   Stress: No Stress Concern Present (3/11/2024)    [place]  [place] [employer] - Occupational Stress Questionnaire     Feeling of Stress : Only a little   Housing Stability: Low Risk  (3/11/2024)    Housing Stability Vital Sign     Unable to Pay for Housing in the Last Year: No     Number of Places Lived in the Last Year: 1     Unstable Housing in the Last Year: No       Review of patient's allergies indicates:   Allergen Reactions    Felodipine Swelling    Blueberry Diarrhea    Zocor [simvastatin]      Bone and joint pain       Labs:  Lab Results   Component Value Date    HGBA1C 5.6 03/23/2022       Lab Results   Component Value Date    WBC 5.86 07/08/2024    HGB 13.6 (L) 07/08/2024    HCT 40.5 07/08/2024    MCV 88 07/08/2024     07/08/2024           Review of Systems:  Lower back pain.  Left leg pain.  Balance of review of systems is negative.    Physical Exam:  Vitals:    10/17/24 0940   BP: (!) 111/59   Pulse: (!) 55   Weight: 95.5 kg (210 lb 8.6 oz)   PainSc:   1   PainLoc: Back     Body mass index is 30.21 kg/m².    Pain disability index:      10/17/2024     9:38 AM   Last 3 PDI Scores   Pain Disability Index (PDI) 14       Gen: NAD  Psych: mood appropriate for given condition  HEENT: eyes anicteric   CV: RRR, 2+ radial pulse  Respiratory: non-labored, no signs of respiratory distress  Abd: non-distended  Skin: warm, dry and intact.  Gait: Able to heel walk, toe walk. No antalgic gait.     Coordination:   Tandem walking coordination: normal    Cervical spine: ROM is full in flexion, extension and lateral rotation without increased pain.  Spurling's maneuver causes no neck pain to either side.  Myofascial exam: No Tenderness to palpation across cervical paraspinous region bilaterally.    Lumbar spine:  Lumbar spine: ROM is full with flexion extension and oblique extension with no increased pain.    Jaron's test causes no increased pain on either side.    Supine straight leg raise is negative bilaterally.    Internal and external rotation of the  hip causes no increased pain on either side.  Myofascial exam: No tenderness to palpation across lumbar paraspinous muscles. No tenderness to palpation over the bilateral greater trochanters and bilateral SI joint    Sensory:  Intact and symmetrical to light touch in C4-T1 dermatomes bilaterally. Intact and symmetrical to light touch in L1-S1 dermatomes bilaterally.    Motor:    Right Left   C4 Shoulder Abduction  5  5   C5 Elbow Flexion    5  5   C6 Wrist Extension  5  5   C7 Elbow Extension   5  5   C8/T1 Hand Intrinsics   5  5        Right Left   L2/3 Iliacus Hip flexion  5  5   L3/4 Qudratus Femoris Knee Extension  5  5   L4/5 Tib Anterior Ankle Dorsiflexion   5  5   L5/S1 Extensor Hallicus Longus Great toe extension  5  5   S1/S2 Gastroc/Soleus Plantar Flexion  5  5      Right Left   Triceps DTR 2+ 2+   Biceps DTR 2+ 2+   Brachioradialis DTR 2+ 2+   Patellar DTR 2+ 2+   Achilles DTR 2+ 2+   Kirk Absent  Absent   Clonus Absent Absent   Babinski Absent Absent       Imaging:  none      Assessment:   Ashwin Thompson is a 69 y.o. year old male patient who has a past medical history of Arthritis, Cancer, Gout, unspecified, Hyperlipidemia, Hypertension, Hypothyroid, Kidney stone, and Thyroid disease. He presents in referral from Emilee Mcintosh/ self referral for lower back and left leg pain. Hip pain.    Lower back and left leg pain can be myfoascial, but likely left L5, S1 radiculoapthy.  Radicular pain seems improved today.  Buttock to hip pain likely referred from lumbar region; possible early neurogenic claudication vs myofascial pain. No neurological deficits.       Plan:  - to help with pain, we discussed role of medications, home stretches, exercise  - will try medrol taper and home exercise  - if no improvement, then consider PT/ and or MRI to further assess  - xray of the lumbar spine today to check alignment and for any arthritic changes; xray of the hips today to rule out hip arthritis as the  source of hip region pain, although I do believe the buttock and hip pain is referred from the lumbar spine.     Problem List Items Addressed This Visit    None  Visit Diagnoses       Lumbar back pain    -  Primary    Relevant Medications    methylPREDNISolone (MEDROL, JASON,) 4 mg tablet    Other Relevant Orders    X-Ray Lumbar Complete Including Flex And Ext    Lumbar radiculopathy        Relevant Medications    methylPREDNISolone (MEDROL, JASON,) 4 mg tablet    Other Relevant Orders    X-Ray Lumbar Complete Including Flex And Ext    Bilateral hip pain        Relevant Orders    X-Ray Hips Bilateral 2 View Incl AP Pelvis            Follow Up: RTC if no improvement with medications and home exercise    : Reviewed and consistent with medication use as prescribed.    Thank you for referring this interesting patient, and I look forward to continuing to collaborate in his care.        Bhavya Sevilla PA-C  [facility]

## 2024-10-24 ENCOUNTER — TELEPHONE (OUTPATIENT)
Dept: PAIN MEDICINE | Facility: CLINIC | Age: 69
End: 2024-10-24
Payer: MEDICARE

## 2024-10-24 NOTE — TELEPHONE ENCOUNTER
Spoke with patient and relayed Bhavya's result message. Patient voiced understanding and had no further questions    ----- Message from Bhavya Sevilla PA-C sent at 10/22/2024  9:23 AM CDT -----  Results Reviewed.  Please call with below:    Xrays of the hips and lower back reviewed.  Hips look great with no major issues.  There is mild left curvature of the lower back spine.  Thre are mild degenerative changes in the lumbar spine.  No fractures. No changes to plan of care discussed in clinic.

## 2024-11-25 ENCOUNTER — OFFICE VISIT (OUTPATIENT)
Dept: HEMATOLOGY/ONCOLOGY | Facility: CLINIC | Age: 69
End: 2024-11-25
Payer: MEDICARE

## 2024-11-25 VITALS
BODY MASS INDEX: 29.82 KG/M2 | TEMPERATURE: 98 F | WEIGHT: 208.31 LBS | RESPIRATION RATE: 16 BRPM | HEART RATE: 50 BPM | HEIGHT: 70 IN | SYSTOLIC BLOOD PRESSURE: 143 MMHG | OXYGEN SATURATION: 97 % | DIASTOLIC BLOOD PRESSURE: 79 MMHG

## 2024-11-25 DIAGNOSIS — K13.79 MASS OF ORAL CAVITY: ICD-10-CM

## 2024-11-25 DIAGNOSIS — C06.9 CANCER OF ORAL CAVITY: ICD-10-CM

## 2024-11-25 DIAGNOSIS — K13.79 ACQUIRED DYSPLASIA OF ORAL CAVITY: Primary | ICD-10-CM

## 2024-11-25 PROCEDURE — 99213 OFFICE O/P EST LOW 20 MIN: CPT | Mod: S$PBB,,, | Performed by: NURSE PRACTITIONER

## 2024-11-25 PROCEDURE — 99999 PR PBB SHADOW E&M-EST. PATIENT-LVL V: CPT | Mod: PBBFAC,,, | Performed by: NURSE PRACTITIONER

## 2024-11-25 PROCEDURE — 99215 OFFICE O/P EST HI 40 MIN: CPT | Mod: PBBFAC,PN | Performed by: NURSE PRACTITIONER

## 2024-11-25 RX ORDER — COLCHICINE 0.6 MG/1
TABLET ORAL
COMMUNITY
Start: 2024-10-09

## 2024-11-25 NOTE — PROGRESS NOTES
Note to patients: In accordance with the  Century Cures Act, patients are now granted immediate electronic access to their medical records. This note is primarily intended for communication among medical professionals. As a result, it may incorporate medical terminology, abbreviations, or language that could appear blunt or unfamiliar. If you have questions about this document, we encourage you to discuss it with your physician.      [facility]  [facility]    Patient: Ashwin Thompson    : 1955    MRN: [id number]  Primary Care Provider: Emilee Mcintosh NP  Referring Provider: No ref. provider found   Date of Encounter: 2024    DIAGNOSIS:    Cancer Staging   No matching staging information was found for the patient.      CC: 3 month follow-up visit    HPI:   Ashwin Thompson is a 69 y.o. 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 He went to his dentist for routine cleaning in August and noticed he had some white plaques in his mouth that he wanted he referred to ENT. At that time there seemed to be several in multiple locations. He saw Dr. Peterson at [facility] and had a biopsy performed in August which showed squamous mucosa with hyperplasia with basal atypia. He was recommended to have the area ablated with CO2 laser. He was given the option of having the procedure in clinic vs the OR. He didn't feel that he understood completely the reason for the procedure or how to choose anesthetic so he went for a second opinion. He then saw Dr. Tyson (ENT) in [place] and was told it was leukoplakia, but he didn't have the proper equipment for laser ablation.     The lesion is on the right lower gum line. He is having pain. He states his mouth has always been sensitive to spicy food. He is using perioxal which helps some. He reports that it flares up and last for a few days then gets better, but for the past 2 weeks has grown and more  sensitive. Was just on the cheek mucosa, now onto the gums.     Patient denies pain, fever, chills, night sweats, unintentional weight loss, neck mass, enlarged lymph nodes outside of the head or neck, odynophagia, dysphagia, globus sensation, voice changes, cough, hemoptysis, shortness of breath, or new or concerning skin lesions.     Hx of gout and cholesterol problems - going to start immunotherapy for cholesterol soon.    4/15/24: He presents today to review his CT which did not demonstrate any bony involvement. 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    5/1/24: He is here today for his post-op visit. He is having some swelling he states in the inside of him mouth as the surgery site. He is on a full liquid diet. He is using the mouth wash morning and night and as well as anytime he eats or drinks. He is using tylenol 1-2x a day for pain control. He is staying hydrated.     5/9/24: He was having some swelling and pain on Monday but he states it is better today. He is still doing his mouth rinse. He is still on full liquids.    5/23/24: He is doing well. He states he has no concerns but he does have the one area on the left side that we are keeping an eye on it. He states it feels rough which is how it has been. He has a kidney stone and needs a stent and will perform a laser on the left kidney. He wants to make sure it is okay for him to be intubated. He has been having the stone since November, it is a non-obstructing stone. He is wanting to restart nexletol. He is on soft foods and doing good. He is also asking about if he can get implant or bridge in the future.     8/22/24: He noticed on July 15th he started with a bump that was painless at first on his right inside of his lip. He states 2 Saturdays ago it started with pain and he was able to get out clear thick fluid. He states since then it has almost resolved. He never had any further drainage since then. He denies any pain. He denies any other  concerns. He still has numbness to his right lower lip since surgery that may be getting better.     11/25/24: His wisdom tooth on the right lower started moving in September and he had his tooth pulled. His dentist did speak with Dr. Dodson and both agreed best option was to pull the tooth. He was having some issues with healing right after but has completely healed since. His dentist wants to remove the root tips on two of his lower right teeth due to it not healing. He has not yet scheduled it. He did have a bump again on the inside of his lip that came back again after our last visit, its been 2 months but has not come back since. His feeling on his lip is starting to come back. He denies any pain.     TREATMENT HISTORY:  4/25/24: Wide local excision with laser, 4 teeth pulled, marginal mandibulectomy     SUBSTANCE USE:  Smoking: Hx of smokeless tobacco - quit 2 years ago  Denies cigarettes, hookah, marijuana, betel nut, illicit drug, heavy cigar, vape use.    ALLERGIES:  Review of patient's allergies indicates:   Allergen Reactions    Felodipine Swelling    Blueberry Diarrhea    Zocor [simvastatin]      Bone and joint pain         MEDICATIONS:    Current Outpatient Medications:     acebutoloL (SECTRAL) 200 MG capsule, Take 200 mg by mouth 2 (two) times daily., Disp: , Rfl:     allopurinoL (ZYLOPRIM) 100 MG tablet, Take 300 mg by mouth 2 (two) times daily., Disp: , Rfl:     aspirin (ECOTRIN) 81 MG EC tablet, Take 81 mg by mouth once daily., Disp: , Rfl:     bempedoic acid (NEXLETOL) 180 mg Tab, once daily., Disp: , Rfl:     levothyroxine (SYNTHROID) 75 MCG tablet, Take 1 tablet (75 mcg total) by mouth once daily., Disp: 90 tablet, Rfl: 1    magnesium oxide (MAG-OX) 200 MG tablet, 1 tablet every day by oral route., Disp: , Rfl:     vitamin D (VITAMIN D3) 1000 units Tab, Take 1,000 Units by mouth once daily., Disp: , Rfl:     vitamin K2 100 mcg Cap, Take by mouth., Disp: , Rfl:     colchicine (COLCRYS) 0.6 mg  "tablet, Take by mouth. (Patient not taking: Reported on 11/25/2024), Disp: , Rfl:     hydroCHLOROthiazide (HYDRODIURIL) 25 MG tablet, Take 1 tablet (25 mg total) by mouth once daily., Disp: 30 tablet, Rfl: 11    NIFEdipine (ADALAT CC) 30 MG TbSR, Take 1 tablet (30 mg total) by mouth once daily. (Patient taking differently: Take 30 mg by mouth every evening.), Disp: 90 tablet, Rfl: 3    olmesartan (BENICAR) 40 MG tablet, Take 1 tablet (40 mg total) by mouth once daily., Disp: 90 tablet, Rfl: 3    terazosin (HYTRIN) 5 MG capsule, Take 1 capsule (5 mg total) by mouth every evening. (Patient taking differently: Take 5 mg by mouth once daily.), Disp: 30 capsule, Rfl: 11    OTHER HISTORY  Past medical, surgical, family, and social histories have been updated and reviewed as needed since last visit.     See above substance history    REVIEW OF SYSTEMS:   Comprehensive review of systems was discussed with the patient.  It is positive only for the above complaints.    PHYSICAL EXAMINATION:  Blood pressure (!) 143/79, pulse (!) 50, temperature 97.8 °F (36.6 °C), temperature source Temporal, resp. rate 16, height 5' 10" (1.778 m), weight 94.5 kg (208 lb 5.4 oz), SpO2 97%.    Constitutional: Non-toxic appearing.   Psychiatric: Appropriate mood and affect. Cooperative.  Voice: Non-dysphonic, speaking in full sentences.   Neurologic: Cranial nerves grossly intact, no focal deficits.  Head and face: Salivary glands are not enlarged. Face is symmetric. CN VII strength intact.  Skin: No concerning skin lesions.   Eyes: Vision grossly intact, bilateral extraocular movements intact  Ears: Bilateral pinna, mastoid, external ear canal normal. Hearing intact.   Nose: External nose appears normal.   Lips: No ulcers or lesions  Oral cavity: Small area of bone exposure on anterior mandible.  Mucosa is pink and moist. Buccal mucosa, gingiva, anterior tongue, floor of mouth, and hard palate appear normal. Leukoplakia b/t teeth 20&19. Area on " Left lingual gum that we area monitoring  Oropharynx: Mucosa is pink and moist. Soft palate and base of tongue are normal. Posterior pharyngeal wall normal. Tonsils are normal. No lesions.  Neck: Soft and flat,  no masses or lymphadenopathy. No palpable thyroid enlargement or nodules.  Respiratory: Chest expansion symmetric, no audible stridor or stertor. Breathing is unlabored. No active cough.    CLINICAL PHOTOS:                Post-op visit 5/1/24:      Area of leukoplakia that we will be monitoring    From Dr. Bolton's visit:    DATA REVIEWED:     LABORATORY:      Latest Ref Rng & Units 4/3/2024    12:57 PM 4/17/2024    10:43 AM 7/8/2024     7:27 AM   Thyroid Labs   Sodium 136 - 145 mmol/L  137  139    Potassium 3.5 - 5.1 mmol/L  3.9  3.8    Chloride 95 - 110 mmol/L  103  107    Carbon Dioxide 22 - 31 mmol/L  26  22    Glucose 70 - 110 mg/dL  107  116    Blood Urea Nitrogen 9 - 21 mg/dL  25  44    Creatinine 0.50 - 1.40 mg/dL 1.3  1.29  1.60    Calcium 8.4 - 10.2 mg/dL  10.3  10.0    Anion Gap 5 - 12 mmol/L  8  10    WBC 3.90 - 12.70 K/uL  5.11  5.86    RBC 4.60 - 6.20 M/uL  4.78  4.62    Hemoglobin 14.0 - 18.0 g/dL  13.8  13.6    Hematocrit 40.0 - 54.0 %  41.2  40.5    MCV 82 - 98 fL  86  88    MCH 27.0 - 31.0 pg  28.9  29.4    MCHC 32.0 - 36.0 g/dL  33.5  33.6    RDW 11.5 - 14.5 %  14.4  14.6    Platelets 150 - 450 K/uL  206  200    MPV 9.2 - 12.9 fL  11.8  11.5    INR   1.0  1.0    APTT 24.6 - 36.7 sec  29.6  29.0         PATHOLOGY:  DIAGNOSIS:   04/26/2024 JAR   1. ORAL CAVITY, ANTERIOR TONGUE LESION, EXCISION   - FIBROMA   2. DENTAL MARGIN   - SCANT FRAGMENTS OF ATYPICAL SQUAMOUS MUCOSA   3. RIGHT ORAL CAVITY LESION, WIDE LOCAL EXCISION   - INVASIVE VERRUCOUS CARCINOMA   - TUMOR APPROACHES DEEP MARGIN OF THE MEDIAL TISSUE EDGE   - TUMOR AT LEAST 5 MM TO ALL REMAINING MARGINS 4. RIGHT MANDIBULAR    TEETH, EXCISION   - TEETH IDENTIFIED GROSSLY   - ADDITIONAL FRAGMENTS OF SQUAMOUS MUCOSA PRESENT   - NEGATIVE  FOR TUMOR 5. TOOTH FRAGMENT, EXCISION   - TOOTH IDENTIFIED GROSSLY 6. INTERDENTAL MUCOSA, EXCISION   - SQUAMOUS MUCOSA   - NEGATIVE FOR TUMOR   - NEGATIVE FOR DYSPLASIA     ORAL CAVITY:   PROCEDURE: Wide local excision   TUMOR FOCALITY: Unifocal   TUMOR SITE: Right mandibular gingiva   TUMOR LATERALITY: Right   TUMOR SIZE: 24 mm   TUMOR DEPTH OF INVASION (MM): 2 mm   HISTOLOGIC TYPE: Invasive verrucous carcinoma   HISTOLOGIC GRADE: Well differentiated   MARGINS: Negative   DISTANCE FROM CLOSEST MARGIN (MM): Tumor approaches deep margin of the    medial tissue edge (see parts 4 and 5) Tumor at least 5 mm to all    remainder margins   SPECIFY MARGIN: N/A   PERINEURAL INVASION: Negative   LYMPHOVASCULAR INVASION: Negative   REGIONAL LYMPH NODES: N/A   NUMBER OF LYMPH NODES WITH TUMOR: N/A   LATERALITY OF NODES WITH TUMOR: N/A   SIZE OF LARGEST SHALA METASTATIC DEPOSIT: N/A   EXTRANODAL EXTENSION: N/A   NUMBER OF LYMPH NODES EXAMINED: 0   DISTANT METASTASIS: Not known   TNM CODE: pT2 pN not assigned (no nodes submitted / found)   Tumor at least 5 mm to all remainder margins   _______________________________________________________________________________________________________       3/14/2024:     Abnormal   ORAL CAVITY MASS, BIOPSY:  Atypical verrucous proliferation.  See comment.    Comment:  This is a bulky hyperkeratotic and warty squamous proliferation with dense lichenoid chronic inflammation.  There is a prominent endophytic component with expansive bulbous rete ridges concerning for pushing invasion.  The lesion is  predominantly cytologically bland with only mild atypia confined to the basal layer.  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  However, definitive diagnosis in this particular  setting is difficult without examination of a full excisional specimen. Dr Phil Biswas also reviewed this case and agrees with these  findings.    P16 immunostaining is pending.  The results will be issued in a supplemental report.             IMAGING:  CT SOFT TISSUE NECK WITH CONTRAST 4/3/24  Impression:     1. No evidence of metastatic disease within the neck or any aggressive locoregional soft tissue or bony involvement.  This is with special attention to the right mandibular region.  The patient's known lesion is not clearly/well-defined by CT as described above.    ASSESSMENT AND PLAN:  1. Acquired dysplasia of oral cavity    2. Cancer of oral cavity    3. Mass of oral cavity        Ashwin Thompson is a 69 y.o. male with right oral leukoplakia. High suspicion for dysplasia vs verrucous carcinoma.  - Will discuss case with Dr. Dodson and review pictures  - Continue dental visits  - Call with any areas of concern  - He would like a name for an oral surgeon, refer to Dr. Bang     Follow-up in 3 months    30 minutes spend in direct patient care. 10 minutes on consult and charting.     Addendum: reviewed with Dr. Dodson about upcoming surgery. Does not need surgery if not having any issues. Called to review with patient and he will wait on surgery for now.

## 2025-01-27 ENCOUNTER — PATIENT MESSAGE (OUTPATIENT)
Dept: HEMATOLOGY/ONCOLOGY | Facility: CLINIC | Age: 70
End: 2025-01-27
Payer: MEDICARE

## 2025-02-24 ENCOUNTER — OFFICE VISIT (OUTPATIENT)
Dept: HEMATOLOGY/ONCOLOGY | Facility: CLINIC | Age: 70
End: 2025-02-24
Payer: MEDICARE

## 2025-02-24 VITALS
SYSTOLIC BLOOD PRESSURE: 120 MMHG | OXYGEN SATURATION: 96 % | DIASTOLIC BLOOD PRESSURE: 71 MMHG | BODY MASS INDEX: 28.72 KG/M2 | RESPIRATION RATE: 16 BRPM | WEIGHT: 200.63 LBS | TEMPERATURE: 97 F | HEIGHT: 70 IN | HEART RATE: 51 BPM

## 2025-02-24 DIAGNOSIS — C06.9 CANCER OF ORAL CAVITY: ICD-10-CM

## 2025-02-24 DIAGNOSIS — K13.79 MASS OF ORAL CAVITY: ICD-10-CM

## 2025-02-24 DIAGNOSIS — K13.79 ACQUIRED DYSPLASIA OF ORAL CAVITY: Primary | ICD-10-CM

## 2025-02-24 PROCEDURE — 99214 OFFICE O/P EST MOD 30 MIN: CPT | Mod: S$PBB,,, | Performed by: NURSE PRACTITIONER

## 2025-02-24 PROCEDURE — 99214 OFFICE O/P EST MOD 30 MIN: CPT | Mod: PBBFAC,PN | Performed by: NURSE PRACTITIONER

## 2025-02-24 PROCEDURE — 99999 PR PBB SHADOW E&M-EST. PATIENT-LVL IV: CPT | Mod: PBBFAC,,, | Performed by: NURSE PRACTITIONER

## 2025-02-24 RX ORDER — DOXYCYCLINE 100 MG/1
100 CAPSULE ORAL 2 TIMES DAILY
COMMUNITY
Start: 2025-02-19

## 2025-02-24 NOTE — PROGRESS NOTES
Note to patients: In accordance with the  Century Cures Act, patients are now granted immediate electronic access to their medical records. This note is primarily intended for communication among medical professionals. As a result, it may incorporate medical terminology, abbreviations, or language that could appear blunt or unfamiliar. If you have questions about this document, we encourage you to discuss it with your physician.      [facility]  [facility]    Patient: Ashwin Thompson    : 1955    MRN: [id number]  Primary Care Provider: Emilee Mcintosh NP  Referring Provider: No ref. provider found   Date of Encounter: 2025    DIAGNOSIS:    Cancer Staging   No matching staging information was found for the patient.      CC: 3 month follow-up visit    HPI:   Ashwin Thompson is a 69 y.o. 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 He went to his dentist for routine cleaning in August and noticed he had some white plaques in his mouth that he wanted he referred to ENT. At that time there seemed to be several in multiple locations. He saw Dr. Peterson at [facility] and had a biopsy performed in August which showed squamous mucosa with hyperplasia with basal atypia. He was recommended to have the area ablated with CO2 laser. He was given the option of having the procedure in clinic vs the OR. He didn't feel that he understood completely the reason for the procedure or how to choose anesthetic so he went for a second opinion. He then saw Dr. Tyson (ENT) in [place] and was told it was leukoplakia, but he didn't have the proper equipment for laser ablation.     The lesion is on the right lower gum line. He is having pain. He states his mouth has always been sensitive to spicy food. He is using perioxal which helps some. He reports that it flares up and last for a few days then gets better, but for the past 2 weeks has grown and more  sensitive. Was just on the cheek mucosa, now onto the gums.     Patient denies pain, fever, chills, night sweats, unintentional weight loss, neck mass, enlarged lymph nodes outside of the head or neck, odynophagia, dysphagia, globus sensation, voice changes, cough, hemoptysis, shortness of breath, or new or concerning skin lesions.     Hx of gout and cholesterol problems - going to start immunotherapy for cholesterol soon.    4/15/24: He presents today to review his CT which did not demonstrate any bony involvement. 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    5/1/24: He is here today for his post-op visit. He is having some swelling he states in the inside of him mouth as the surgery site. He is on a full liquid diet. He is using the mouth wash morning and night and as well as anytime he eats or drinks. He is using tylenol 1-2x a day for pain control. He is staying hydrated.     5/9/24: He was having some swelling and pain on Monday but he states it is better today. He is still doing his mouth rinse. He is still on full liquids.    5/23/24: He is doing well. He states he has no concerns but he does have the one area on the left side that we are keeping an eye on it. He states it feels rough which is how it has been. He has a kidney stone and needs a stent and will perform a laser on the left kidney. He wants to make sure it is okay for him to be intubated. He has been having the stone since November, it is a non-obstructing stone. He is wanting to restart nexletol. He is on soft foods and doing good. He is also asking about if he can get implant or bridge in the future.     8/22/24: He noticed on July 15th he started with a bump that was painless at first on his right inside of his lip. He states 2 Saturdays ago it started with pain and he was able to get out clear thick fluid. He states since then it has almost resolved. He never had any further drainage since then. He denies any pain. He denies any other  concerns. He still has numbness to his right lower lip since surgery that may be getting better.     11/25/24: His wisdom tooth on the right lower started moving in September and he had his tooth pulled. His dentist did speak with Dr. Dodson and both agreed best option was to pull the tooth. He was having some issues with healing right after but has completely healed since. His dentist wants to remove the root tips on two of his lower right teeth due to it not healing. He has not yet scheduled it. He did have a bump again on the inside of his lip that came back again after our last visit, its been 2 months but has not come back since. His feeling on his lip is starting to come back. He denies any pain.     2/24/25: He says that the spot has grown in the last month on the left bottom behind his teeth. He denies any pain in the area. He is also wanting to schedule a colonoscopy since he has never had one. He has a visit with his PCP tomorrow. He has no other concerns at this time.     TREATMENT HISTORY:  4/25/24: Wide local excision with laser, 4 teeth pulled, marginal mandibulectomy     SUBSTANCE USE:  Smoking: Hx of smokeless tobacco - quit 2 years ago  Denies cigarettes, hookah, marijuana, betel nut, illicit drug, heavy cigar, vape use.    ALLERGIES:  Review of patient's allergies indicates:   Allergen Reactions    Felodipine Swelling    Blueberry Diarrhea    Zocor [simvastatin]      Bone and joint pain         MEDICATIONS:    Current Outpatient Medications:     acebutoloL (SECTRAL) 200 MG capsule, Take 200 mg by mouth 2 (two) times daily., Disp: , Rfl:     allopurinoL (ZYLOPRIM) 100 MG tablet, Take 300 mg by mouth 2 (two) times daily., Disp: , Rfl:     aspirin (ECOTRIN) 81 MG EC tablet, Take 81 mg by mouth once daily., Disp: , Rfl:     bempedoic acid (NEXLETOL) 180 mg Tab, once daily., Disp: , Rfl:     colchicine (COLCRYS) 0.6 mg tablet, Take by mouth., Disp: , Rfl:     doxycycline (MONODOX) 100 MG capsule, Take  "100 mg by mouth 2 (two) times daily., Disp: , Rfl:     levothyroxine (SYNTHROID) 75 MCG tablet, Take 1 tablet (75 mcg total) by mouth once daily., Disp: 90 tablet, Rfl: 1    magnesium oxide (MAG-OX) 200 MG tablet, 1 tablet every day by oral route., Disp: , Rfl:     vitamin D (VITAMIN D3) 1000 units Tab, Take 1,000 Units by mouth once daily., Disp: , Rfl:     vitamin K2 100 mcg Cap, Take by mouth., Disp: , Rfl:     hydroCHLOROthiazide (HYDRODIURIL) 25 MG tablet, Take 1 tablet (25 mg total) by mouth once daily., Disp: 30 tablet, Rfl: 11    NIFEdipine (ADALAT CC) 30 MG TbSR, Take 1 tablet (30 mg total) by mouth once daily. (Patient taking differently: Take 30 mg by mouth every evening.), Disp: 90 tablet, Rfl: 3    olmesartan (BENICAR) 40 MG tablet, Take 1 tablet (40 mg total) by mouth once daily., Disp: 90 tablet, Rfl: 3    terazosin (HYTRIN) 5 MG capsule, Take 1 capsule (5 mg total) by mouth every evening. (Patient taking differently: Take 5 mg by mouth once daily.), Disp: 30 capsule, Rfl: 11    OTHER HISTORY  Past medical, surgical, family, and social histories have been updated and reviewed as needed since last visit.     See above substance history    REVIEW OF SYSTEMS:   Comprehensive review of systems was discussed with the patient.  It is positive only for the above complaints.    PHYSICAL EXAMINATION:  Blood pressure 120/71, pulse (!) 51, temperature 97.4 °F (36.3 °C), temperature source Temporal, resp. rate 16, height 5' 10" (1.778 m), weight 91 kg (200 lb 9.9 oz), SpO2 96%.    Constitutional: Non-toxic appearing.   Psychiatric: Appropriate mood and affect. Cooperative.  Voice: Non-dysphonic, speaking in full sentences.   Neurologic: Cranial nerves grossly intact, no focal deficits.  Head and face: Salivary glands are not enlarged. Face is symmetric. CN VII strength intact.  Skin: No concerning skin lesions.   Eyes: Vision grossly intact, bilateral extraocular movements intact  Ears: Bilateral pinna, mastoid, " external ear canal normal. Hearing intact.   Nose: External nose appears normal.   Lips: No ulcers or lesions  Oral cavity:  Mucosa is pink and moist. Buccal mucosa, gingiva, anterior tongue, floor of mouth, and hard palate appear normal. Leukoplakia b/t teeth 20&19. Area on Left lingual gum that we area monitoring, larger today and will plan for biopsy   Oropharynx: Mucosa is pink and moist. Soft palate and base of tongue are normal. Posterior pharyngeal wall normal. Tonsils are normal. No lesions.  Neck: Soft and flat,  no masses or lymphadenopathy. No palpable thyroid enlargement or nodules.  Respiratory: Chest expansion symmetric, no audible stridor or stertor. Breathing is unlabored. No active cough.    CLINICAL PHOTOS:  2/24/25 11/25/25:                Post-op visit 5/1/24:      Area of leukoplakia that we will be monitoring    From Dr. Bolton's visit:      Timeout Pause    Row Name 11:20       Timeout Pause   Person Completing Patient Verification Patient       Stated 2 Patient Identifiers Y       Correct Procedure? Y       Correct Site? Y          BIOPSY OF left lingual gingiva  Provider: Anneliese Dodson MD  Indication: mass on lingual gingiva  After verbal and written consent was obtained, the area was anesthetized with 1% lidocaine and 1:100,000 epinephrine solution.    Thereafter, a cupped forceps was used to harvest a representative specimen of mucosa, which was sent in formalin for routine evaluation.    Hemostasis was achieved with silver nitrate and pressure.    The patient tolerated the procedure well with no complications.     DATA REVIEWED:   LABORATORY:      Latest Ref Rng & Units 4/3/2024    12:57 PM 4/17/2024    10:43 AM 7/8/2024     7:27 AM   Thyroid Labs   Sodium 136 - 145 mmol/L  137  139    Potassium 3.5 - 5.1 mmol/L  3.9  3.8    Chloride 95 - 110 mmol/L  103  107    Carbon Dioxide 22 - 31 mmol/L  26  22    Glucose 70 - 110 mg/dL  107  116    Blood Urea Nitrogen 9 - 21 mg/dL  25  44     Creatinine 0.50 - 1.40 mg/dL 1.3  1.29  1.60    Calcium 8.4 - 10.2 mg/dL  10.3  10.0    Anion Gap 5 - 12 mmol/L  8  10    WBC 3.90 - 12.70 K/uL  5.11  5.86    RBC 4.60 - 6.20 M/uL  4.78  4.62    Hemoglobin 14.0 - 18.0 g/dL  13.8  13.6    Hematocrit 40.0 - 54.0 %  41.2  40.5    MCV 82 - 98 fL  86  88    MCH 27.0 - 31.0 pg  28.9  29.4    MCHC 32.0 - 36.0 g/dL  33.5  33.6    RDW 11.5 - 14.5 %  14.4  14.6    Platelets 150 - 450 K/uL  206  200    MPV 9.2 - 12.9 fL  11.8  11.5    INR   1.0  1.0    APTT 24.6 - 36.7 sec  29.6  29.0         PATHOLOGY:  DIAGNOSIS:   04/26/2024 JAR   1. ORAL CAVITY, ANTERIOR TONGUE LESION, EXCISION   - FIBROMA   2. DENTAL MARGIN   - SCANT FRAGMENTS OF ATYPICAL SQUAMOUS MUCOSA   3. RIGHT ORAL CAVITY LESION, WIDE LOCAL EXCISION   - INVASIVE VERRUCOUS CARCINOMA   - TUMOR APPROACHES DEEP MARGIN OF THE MEDIAL TISSUE EDGE   - TUMOR AT LEAST 5 MM TO ALL REMAINING MARGINS 4. RIGHT MANDIBULAR    TEETH, EXCISION   - TEETH IDENTIFIED GROSSLY   - ADDITIONAL FRAGMENTS OF SQUAMOUS MUCOSA PRESENT   - NEGATIVE FOR TUMOR 5. TOOTH FRAGMENT, EXCISION   - TOOTH IDENTIFIED GROSSLY 6. INTERDENTAL MUCOSA, EXCISION   - SQUAMOUS MUCOSA   - NEGATIVE FOR TUMOR   - NEGATIVE FOR DYSPLASIA     ORAL CAVITY:   PROCEDURE: Wide local excision   TUMOR FOCALITY: Unifocal   TUMOR SITE: Right mandibular gingiva   TUMOR LATERALITY: Right   TUMOR SIZE: 24 mm   TUMOR DEPTH OF INVASION (MM): 2 mm   HISTOLOGIC TYPE: Invasive verrucous carcinoma   HISTOLOGIC GRADE: Well differentiated   MARGINS: Negative   DISTANCE FROM CLOSEST MARGIN (MM): Tumor approaches deep margin of the    medial tissue edge (see parts 4 and 5) Tumor at least 5 mm to all    remainder margins   SPECIFY MARGIN: N/A   PERINEURAL INVASION: Negative   LYMPHOVASCULAR INVASION: Negative   REGIONAL LYMPH NODES: N/A   NUMBER OF LYMPH NODES WITH TUMOR: N/A   LATERALITY OF NODES WITH TUMOR: N/A   SIZE OF LARGEST SHALA METASTATIC DEPOSIT: N/A   EXTRANODAL EXTENSION: N/A    NUMBER OF LYMPH NODES EXAMINED: 0   DISTANT METASTASIS: Not known   TNM CODE: pT2 pN not assigned (no nodes submitted / found)   Tumor at least 5 mm to all remainder margins   _______________________________________________________________________________________________________       3/14/2024:     Abnormal   ORAL CAVITY MASS, BIOPSY:  Atypical verrucous proliferation.  See comment.    Comment:  This is a bulky hyperkeratotic and warty squamous proliferation with dense lichenoid chronic inflammation.  There is a prominent endophytic component with expansive bulbous rete ridges concerning for pushing invasion.  The lesion is  predominantly cytologically bland with only mild atypia confined to the basal layer.  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  However, definitive diagnosis in this particular  setting is difficult without examination of a full excisional specimen. Dr Phil Biswas also reviewed this case and agrees with these findings.    P16 immunostaining is pending.  The results will be issued in a supplemental report.             IMAGING:  CT SOFT TISSUE NECK WITH CONTRAST 4/3/24  Impression:     1. No evidence of metastatic disease within the neck or any aggressive locoregional soft tissue or bony involvement.  This is with special attention to the right mandibular region.  The patient's known lesion is not clearly/well-defined by CT as described above.    ASSESSMENT AND PLAN:  1. Acquired dysplasia of oral cavity    2. Cancer of oral cavity    3. Mass of oral cavity        Ashwin Thompson is a 69 y.o. male with right oral leukoplakia. High suspicion for dysplasia vs verrucous carcinoma.  - biopsy today in the office   - Will call with path results  - Continue dental visits  - Discuss colonoscopy with PCP tomorrow, if he needs I can place a GI referral     Follow-up in 3 months fro routine visit or sooner pending path  result    30 minutes spend in direct patient care.

## 2025-03-06 ENCOUNTER — TELEPHONE (OUTPATIENT)
Dept: HEMATOLOGY/ONCOLOGY | Facility: CLINIC | Age: 70
End: 2025-03-06
Payer: MEDICARE

## 2025-03-06 NOTE — TELEPHONE ENCOUNTER
Called to review path report. Will plan on surgery with laser for excision on 4/2 per Dr. Dodson. All questions were answered.

## 2025-03-07 ENCOUNTER — TELEPHONE (OUTPATIENT)
Dept: HEMATOLOGY/ONCOLOGY | Facility: CLINIC | Age: 70
End: 2025-03-07
Payer: MEDICARE

## 2025-03-07 DIAGNOSIS — C06.9 CANCER OF ORAL CAVITY: ICD-10-CM

## 2025-03-07 DIAGNOSIS — K13.79 MASS OF ORAL CAVITY: ICD-10-CM

## 2025-03-07 DIAGNOSIS — K13.79 ACQUIRED DYSPLASIA OF ORAL CAVITY: Primary | ICD-10-CM

## 2025-03-07 NOTE — TELEPHONE ENCOUNTER
Provided pt with pre op instructions, surgery, PAT and post op dates, times and locations.  Pt will contact his cardiologist for advice on holding ASA. All questions answered to pt's satisfaction.

## 2025-03-31 ENCOUNTER — TELEPHONE (OUTPATIENT)
Dept: HEMATOLOGY/ONCOLOGY | Facility: CLINIC | Age: 70
End: 2025-03-31
Payer: MEDICARE

## 2025-03-31 ENCOUNTER — PATIENT MESSAGE (OUTPATIENT)
Dept: HEMATOLOGY/ONCOLOGY | Facility: CLINIC | Age: 70
End: 2025-03-31
Payer: MEDICARE

## 2025-03-31 NOTE — TELEPHONE ENCOUNTER
Called and spoke with patient. Patient notified cardiac clearance and Asprin hold was faxed to his cardiologist.        ----- Message from Tracey sent at 3/31/2025 10:46 AM CDT -----  Type: Needs Medical AdviceWho Called:  ptBest Call Back Number: [phone] Additional Information: requesting a call back to get clearance to stop Asprin for surgery day. Pt states he sent message from other  in [facility]

## 2025-04-09 ENCOUNTER — OFFICE VISIT (OUTPATIENT)
Dept: HEMATOLOGY/ONCOLOGY | Facility: CLINIC | Age: 70
End: 2025-04-09
Payer: MEDICARE

## 2025-04-09 VITALS
SYSTOLIC BLOOD PRESSURE: 130 MMHG | OXYGEN SATURATION: 96 % | HEIGHT: 70 IN | RESPIRATION RATE: 16 BRPM | WEIGHT: 196.19 LBS | BODY MASS INDEX: 28.09 KG/M2 | TEMPERATURE: 98 F | HEART RATE: 51 BPM | DIASTOLIC BLOOD PRESSURE: 76 MMHG

## 2025-04-09 DIAGNOSIS — K13.79 ACQUIRED DYSPLASIA OF ORAL CAVITY: Primary | ICD-10-CM

## 2025-04-09 DIAGNOSIS — C06.9 CANCER OF ORAL CAVITY: ICD-10-CM

## 2025-04-09 PROCEDURE — 99999 PR PBB SHADOW E&M-EST. PATIENT-LVL IV: CPT | Mod: PBBFAC,,, | Performed by: NURSE PRACTITIONER

## 2025-04-09 PROCEDURE — 99024 POSTOP FOLLOW-UP VISIT: CPT | Mod: POP,,, | Performed by: NURSE PRACTITIONER

## 2025-04-09 PROCEDURE — 99214 OFFICE O/P EST MOD 30 MIN: CPT | Mod: PBBFAC,PN | Performed by: NURSE PRACTITIONER

## 2025-04-09 RX ORDER — TADALAFIL 5 MG/1
TABLET ORAL
COMMUNITY
Start: 2025-01-16

## 2025-04-09 RX ORDER — TADALAFIL 20 MG/1
1 TABLET ORAL DAILY PRN
COMMUNITY
Start: 2025-02-04

## 2025-04-09 NOTE — PROGRESS NOTES
Note to patients: In accordance with the  Century Cures Act, patients are now granted immediate electronic access to their medical records. This note is primarily intended for communication among medical professionals. As a result, it may incorporate medical terminology, abbreviations, or language that could appear blunt or unfamiliar. If you have questions about this document, we encourage you to discuss it with your physician.      [facility]  [facility]    Patient: Ashwin Thompson    : 1955    MRN: [id number]  Primary Care Provider: Emilee Mcintosh NP  Referring Provider: No ref. provider found   Date of Encounter: 2025    DIAGNOSIS:    Cancer Staging   No matching staging information was found for the patient.      CC: 3 month follow-up visit    HPI:   Ashwin Thompson is a 70 y.o. 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 He went to his dentist for routine cleaning in August and noticed he had some white plaques in his mouth that he wanted he referred to ENT. At that time there seemed to be several in multiple locations. He saw Dr. Peterson at [facility] and had a biopsy performed in August which showed squamous mucosa with hyperplasia with basal atypia. He was recommended to have the area ablated with CO2 laser. He was given the option of having the procedure in clinic vs the OR. He didn't feel that he understood completely the reason for the procedure or how to choose anesthetic so he went for a second opinion. He then saw Dr. Tyson (ENT) in [place] and was told it was leukoplakia, but he didn't have the proper equipment for laser ablation.     The lesion is on the right lower gum line. He is having pain. He states his mouth has always been sensitive to spicy food. He is using perioxal which helps some. He reports that it flares up and last for a few days then gets better, but for the past 2 weeks has grown and more  sensitive. Was just on the cheek mucosa, now onto the gums.     Patient denies pain, fever, chills, night sweats, unintentional weight loss, neck mass, enlarged lymph nodes outside of the head or neck, odynophagia, dysphagia, globus sensation, voice changes, cough, hemoptysis, shortness of breath, or new or concerning skin lesions.     Hx of gout and cholesterol problems - going to start immunotherapy for cholesterol soon.    4/15/24: He presents today to review his CT which did not demonstrate any bony involvement. 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    5/1/24: He is here today for his post-op visit. He is having some swelling he states in the inside of him mouth as the surgery site. He is on a full liquid diet. He is using the mouth wash morning and night and as well as anytime he eats or drinks. He is using tylenol 1-2x a day for pain control. He is staying hydrated.     5/9/24: He was having some swelling and pain on Monday but he states it is better today. He is still doing his mouth rinse. He is still on full liquids.    5/23/24: He is doing well. He states he has no concerns but he does have the one area on the left side that we are keeping an eye on it. He states it feels rough which is how it has been. He has a kidney stone and needs a stent and will perform a laser on the left kidney. He wants to make sure it is okay for him to be intubated. He has been having the stone since November, it is a non-obstructing stone. He is wanting to restart nexletol. He is on soft foods and doing good. He is also asking about if he can get implant or bridge in the future.     8/22/24: He noticed on July 15th he started with a bump that was painless at first on his right inside of his lip. He states 2 Saturdays ago it started with pain and he was able to get out clear thick fluid. He states since then it has almost resolved. He never had any further drainage since then. He denies any pain. He denies any other  concerns. He still has numbness to his right lower lip since surgery that may be getting better.     11/25/24: His wisdom tooth on the right lower started moving in September and he had his tooth pulled. His dentist did speak with Dr. Dodson and both agreed best option was to pull the tooth. He was having some issues with healing right after but has completely healed since. His dentist wants to remove the root tips on two of his lower right teeth due to it not healing. He has not yet scheduled it. He did have a bump again on the inside of his lip that came back again after our last visit, its been 2 months but has not come back since. His feeling on his lip is starting to come back. He denies any pain.     2/24/25: He says that the spot has grown in the last month on the left bottom behind his teeth. He denies any pain in the area. He is also wanting to schedule a colonoscopy since he has never had one. He has a visit with his PCP tomorrow. He has no other concerns at this time.     4/9/25: he is here today for his post-op appointment. He started with sorenes in the left side by his teeth. He thinks it may be due to going back to a regular diet.     TREATMENT HISTORY:  4/25/24: Wide local excision with laser, 4 teeth pulled, marginal mandibulectomy    4/2/25: ABLATION, USING LASER - Oral Cavity - Lingual gingiva      SUBSTANCE USE:  Smoking: Hx of smokeless tobacco - quit 2 years ago  Denies cigarettes, hookah, marijuana, betel nut, illicit drug, heavy cigar, vape use.    ALLERGIES:  Review of patient's allergies indicates:   Allergen Reactions    Felodipine Swelling    Blueberry Diarrhea    Zocor [simvastatin]      Bone and joint pain         MEDICATIONS:    Current Outpatient Medications:     acebutoloL (SECTRAL) 200 MG capsule, Take 200 mg by mouth 2 (two) times daily., Disp: , Rfl:     allopurinoL (ZYLOPRIM) 100 MG tablet, Take 100 mg by mouth 3 (three) times daily., Disp: , Rfl:     aspirin (ECOTRIN) 81 MG EC  tablet, Take 81 mg by mouth once daily., Disp: , Rfl:     bempedoic acid (NEXLETOL) 180 mg Tab, once daily., Disp: , Rfl:     levothyroxine (SYNTHROID) 75 MCG tablet, Take 1 tablet (75 mcg total) by mouth once daily., Disp: 90 tablet, Rfl: 1    magnesium oxide (MAG-OX) 200 MG tablet, every evening., Disp: , Rfl:     tadalafiL (CIALIS) 20 MG Tab, Take 1 tablet by mouth daily as needed., Disp: , Rfl:     tadalafiL (CIALIS) 5 MG tablet, Take 1 tablet every day by oral route for 30 days., Disp: , Rfl:     vitamin D (VITAMIN D3) 1000 units Tab, Take 1,000 Units by mouth once daily., Disp: , Rfl:     vitamin K2 100 mcg Cap, Take by mouth., Disp: , Rfl:     hydroCHLOROthiazide (HYDRODIURIL) 25 MG tablet, Take 1 tablet (25 mg total) by mouth once daily., Disp: 30 tablet, Rfl: 11    NIFEdipine (ADALAT CC) 30 MG TbSR, Take 1 tablet (30 mg total) by mouth once daily. (Patient taking differently: Take 30 mg by mouth every evening.), Disp: 90 tablet, Rfl: 3    olmesartan (BENICAR) 40 MG tablet, Take 1 tablet (40 mg total) by mouth once daily. (Patient taking differently: Take 40 mg by mouth every morning.), Disp: 90 tablet, Rfl: 3    oxyCODONE (ROXICODONE) 5 MG immediate release tablet, Take 1 tablet (5 mg total) by mouth every 4 (four) hours as needed for Pain. (Patient not taking: Reported on 4/9/2025), Disp: 10 tablet, Rfl: 0    terazosin (HYTRIN) 5 MG capsule, Take 1 capsule (5 mg total) by mouth every evening. (Patient taking differently: Take 5 mg by mouth once daily.), Disp: 30 capsule, Rfl: 11    OTHER HISTORY  Past medical, surgical, family, and social histories have been updated and reviewed as needed since last visit.     See above substance history    REVIEW OF SYSTEMS:   Comprehensive review of systems was discussed with the patient.  It is positive only for the above complaints.    PHYSICAL EXAMINATION:  Blood pressure 130/76, pulse (!) 51, temperature 98.2 °F (36.8 °C), temperature source Temporal, resp. rate 16,  "height 5' 10" (1.778 m), weight 89 kg (196 lb 3.4 oz), SpO2 96%.    Constitutional: Non-toxic appearing.   Psychiatric: Appropriate mood and affect. Cooperative.  Voice: Non-dysphonic, speaking in full sentences.   Neurologic: Cranial nerves grossly intact, no focal deficits.  Head and face: Salivary glands are not enlarged. Face is symmetric. CN VII strength intact.  Skin: No concerning skin lesions.   Eyes: Vision grossly intact, bilateral extraocular movements intact  Ears: Bilateral pinna, mastoid, external ear canal normal. Hearing intact.   Nose: External nose appears normal.   Lips: No ulcers or lesions  Oral cavity:  Mucosa is pink and moist. Buccal mucosa, gingiva, anterior tongue, floor of mouth, and hard palate appear normal. Area on Left lingual gum healing well as area on left mandibular labial gingiva  Oropharynx: Mucosa is pink and moist. Soft palate and base of tongue are normal. Posterior pharyngeal wall normal. Tonsils are normal. No lesions.  Neck: Soft and flat,  no masses or lymphadenopathy. No palpable thyroid enlargement or nodules.  Respiratory: Chest expansion symmetric, no audible stridor or stertor. Breathing is unlabored. No active cough.    CLINICAL PHOTOS:  4/9/25:               2/24/25 11/25/25:                Post-op visit 5/1/24:      Area of leukoplakia that we will be monitoring    From Dr. Bolton's visit:      Timeout Pause    Row Name 11:20       Timeout Pause   Person Completing Patient Verification Patient       Stated 2 Patient Identifiers Y       Correct Procedure? Y       Correct Site? Y          BIOPSY OF left lingual gingiva  Provider: Anneliese Dodson MD  Indication: mass on lingual gingiva  After verbal and written consent was obtained, the area was anesthetized with 1% lidocaine and 1:100,000 epinephrine solution.    Thereafter, a cupped forceps was used to harvest a representative specimen of mucosa, which was sent in formalin for routine evaluation.    Hemostasis was " achieved with silver nitrate and pressure.    The patient tolerated the procedure well with no complications.     DATA REVIEWED:   LABORATORY:      Latest Ref Rng & Units 4/17/2024    10:43 AM 7/8/2024     7:27 AM 3/24/2025    10:41 AM   Thyroid Labs   Sodium 136 - 145 mmol/L 137  139  140    Potassium 3.5 - 5.1 mmol/L 3.9  3.8  4.0    Chloride 95 - 110 mmol/L 103  107  106    Carbon Dioxide 23 - 29 mmol/L 26  22  26    Glucose 70 - 110 mg/dL 107  116  103    Blood Urea Nitrogen 8 - 23 mg/dL 25  44  32    Creatinine 0.50 - 1.40 mg/dL 1.29  1.60  1.50    Calcium 8.7 - 10.5 mg/dL 10.3  10.0  9.6    Anion Gap 8 - 16 mmol/L 8  10  8    WBC 3.90 - 12.70 K/uL 5.11  5.86  5.26    RBC 4.60 - 6.20 M/uL 4.78  4.62  4.09    Hemoglobin 14.0 - 18.0 g/dL 13.8  13.6  12.6    Hematocrit 40.0 - 54.0 % 41.2  40.5  36.8    MCV 82 - 98 fL 86  88  90    MCH 27.0 - 31.0 pg 28.9  29.4  30.8    MCHC 32.0 - 36.0 g/dL 33.5  33.6  34.2    RDW 11.5 - 14.5 % 14.4  14.6  13.8    Platelets 150 - 450 K/uL 206  200  170    MPV 9.2 - 12.9 fL 11.8  11.5  11.6    INR  1.0  1.0  0.9    APTT 24.6 - 36.7 sec 29.6  29.0  27.8         PATHOLOGY:  DIAGNOSIS:     04/04/2025 JWH/kl,tml     LEFT LABIAL GINGIVA:     --SQUAMOUS MUCOSA WITH CHRONIC INFLAMMATION, HYPERKERATOSIS,      PARAKERATOSIS, AND FOCAL EPITHELIAL ATROPHY.     --PAS STAIN IS NEGATIVE FOR FUNGAL ORGANISMS.     --NO DEFINITIVE DYSPLASIA IS IDENTIFIED, ALTHOUGH IT SHOULD BE      NOTED THAT EXTENSIVE CAUTERY ARTIFACT CONSIDERABLY HINDERS       DIAGNOSIS:   04/26/2024 JAR   1. ORAL CAVITY, ANTERIOR TONGUE LESION, EXCISION   - FIBROMA   2. DENTAL MARGIN   - SCANT FRAGMENTS OF ATYPICAL SQUAMOUS MUCOSA   3. RIGHT ORAL CAVITY LESION, WIDE LOCAL EXCISION   - INVASIVE VERRUCOUS CARCINOMA   - TUMOR APPROACHES DEEP MARGIN OF THE MEDIAL TISSUE EDGE   - TUMOR AT LEAST 5 MM TO ALL REMAINING MARGINS 4. RIGHT MANDIBULAR    TEETH, EXCISION   - TEETH IDENTIFIED GROSSLY   - ADDITIONAL FRAGMENTS OF SQUAMOUS MUCOSA  PRESENT   - NEGATIVE FOR TUMOR 5. TOOTH FRAGMENT, EXCISION   - TOOTH IDENTIFIED GROSSLY 6. INTERDENTAL MUCOSA, EXCISION   - SQUAMOUS MUCOSA   - NEGATIVE FOR TUMOR   - NEGATIVE FOR DYSPLASIA     ORAL CAVITY:   PROCEDURE: Wide local excision   TUMOR FOCALITY: Unifocal   TUMOR SITE: Right mandibular gingiva   TUMOR LATERALITY: Right   TUMOR SIZE: 24 mm   TUMOR DEPTH OF INVASION (MM): 2 mm   HISTOLOGIC TYPE: Invasive verrucous carcinoma   HISTOLOGIC GRADE: Well differentiated   MARGINS: Negative   DISTANCE FROM CLOSEST MARGIN (MM): Tumor approaches deep margin of the    medial tissue edge (see parts 4 and 5) Tumor at least 5 mm to all    remainder margins   SPECIFY MARGIN: N/A   PERINEURAL INVASION: Negative   LYMPHOVASCULAR INVASION: Negative   REGIONAL LYMPH NODES: N/A   NUMBER OF LYMPH NODES WITH TUMOR: N/A   LATERALITY OF NODES WITH TUMOR: N/A   SIZE OF LARGEST SHALA METASTATIC DEPOSIT: N/A   EXTRANODAL EXTENSION: N/A   NUMBER OF LYMPH NODES EXAMINED: 0   DISTANT METASTASIS: Not known   TNM CODE: pT2 pN not assigned (no nodes submitted / found)   Tumor at least 5 mm to all remainder margins   _______________________________________________________________________________________________________       3/14/2024:     Abnormal   ORAL CAVITY MASS, BIOPSY:  Atypical verrucous proliferation.  See comment.    Comment:  This is a bulky hyperkeratotic and warty squamous proliferation with dense lichenoid chronic inflammation.  There is a prominent endophytic component with expansive bulbous rete ridges concerning for pushing invasion.  The lesion is  predominantly cytologically bland with only mild atypia confined to the basal layer.  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  However, definitive diagnosis in this particular  setting is difficult without examination of a full excisional specimen. Dr Phil Biswas also reviewed this case and agrees with  these findings.    P16 immunostaining is pending.  The results will be issued in a supplemental report.             IMAGING:  CT SOFT TISSUE NECK WITH CONTRAST 4/3/24  Impression:     1. No evidence of metastatic disease within the neck or any aggressive locoregional soft tissue or bony involvement.  This is with special attention to the right mandibular region.  The patient's known lesion is not clearly/well-defined by CT as described above.    ASSESSMENT AND PLAN:  1. Acquired dysplasia of oral cavity    2. Cancer of oral cavity      Ashwin Thompson is a 70 y.o. male with right oral leukoplakia.S/P recent laser.   - Path reviewed  - Areas healing well  - Continue dental visits  - Continue regular diet    Follow-up in 1 month or sooner with nay concerns

## 2025-05-26 ENCOUNTER — OFFICE VISIT (OUTPATIENT)
Dept: HEMATOLOGY/ONCOLOGY | Facility: CLINIC | Age: 70
End: 2025-05-26
Payer: MEDICARE

## 2025-05-26 VITALS
WEIGHT: 189.63 LBS | RESPIRATION RATE: 16 BRPM | BODY MASS INDEX: 27.15 KG/M2 | TEMPERATURE: 99 F | SYSTOLIC BLOOD PRESSURE: 138 MMHG | HEART RATE: 50 BPM | DIASTOLIC BLOOD PRESSURE: 70 MMHG | OXYGEN SATURATION: 97 % | HEIGHT: 70 IN

## 2025-05-26 DIAGNOSIS — K13.79 ACQUIRED DYSPLASIA OF ORAL CAVITY: Primary | ICD-10-CM

## 2025-05-26 DIAGNOSIS — K13.79 MASS OF ORAL CAVITY: ICD-10-CM

## 2025-05-26 DIAGNOSIS — C06.9 CANCER OF ORAL CAVITY: ICD-10-CM

## 2025-05-26 DIAGNOSIS — Z87.891 PAST HISTORY OF CHEWING TOBACCO USE: ICD-10-CM

## 2025-05-26 PROCEDURE — 99213 OFFICE O/P EST LOW 20 MIN: CPT | Mod: S$PBB,,, | Performed by: NURSE PRACTITIONER

## 2025-05-26 PROCEDURE — 99214 OFFICE O/P EST MOD 30 MIN: CPT | Mod: PBBFAC,PN | Performed by: NURSE PRACTITIONER

## 2025-05-26 PROCEDURE — 99999 PR PBB SHADOW E&M-EST. PATIENT-LVL IV: CPT | Mod: PBBFAC,,, | Performed by: NURSE PRACTITIONER

## 2025-05-26 RX ORDER — HYDROCHLOROTHIAZIDE 25 MG/1
25 TABLET ORAL DAILY
COMMUNITY

## 2025-05-26 NOTE — PROGRESS NOTES
Note to patients: In accordance with the  Century Cures Act, patients are now granted immediate electronic access to their medical records. This note is primarily intended for communication among medical professionals. As a result, it may incorporate medical terminology, abbreviations, or language that could appear blunt or unfamiliar. If you have questions about this document, we encourage you to discuss it with your physician.      [facility]  [facility]    Patient: Ashwin Thompson    : 1955    MRN: [id number]  Primary Care Provider: Emilee Mcintosh NP  Referring Provider: No ref. provider found   Date of Encounter: 2025    DIAGNOSIS:    Cancer Staging   No matching staging information was found for the patient.      CC: 3 month follow-up visit    HPI:   Ashwin Thompson is a 70 y.o. 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 He went to his dentist for routine cleaning in August and noticed he had some white plaques in his mouth that he wanted he referred to ENT. At that time there seemed to be several in multiple locations. He saw Dr. Peterson at [facility] and had a biopsy performed in August which showed squamous mucosa with hyperplasia with basal atypia. He was recommended to have the area ablated with CO2 laser. He was given the option of having the procedure in clinic vs the OR. He didn't feel that he understood completely the reason for the procedure or how to choose anesthetic so he went for a second opinion. He then saw Dr. Tyson (ENT) in [place] and was told it was leukoplakia, but he didn't have the proper equipment for laser ablation.     The lesion is on the right lower gum line. He is having pain. He states his mouth has always been sensitive to spicy food. He is using perioxal which helps some. He reports that it flares up and last for a few days then gets better, but for the past 2 weeks has grown and more  sensitive. Was just on the cheek mucosa, now onto the gums.     Patient denies pain, fever, chills, night sweats, unintentional weight loss, neck mass, enlarged lymph nodes outside of the head or neck, odynophagia, dysphagia, globus sensation, voice changes, cough, hemoptysis, shortness of breath, or new or concerning skin lesions.     Hx of gout and cholesterol problems - going to start immunotherapy for cholesterol soon.    4/15/24: He presents today to review his CT which did not demonstrate any bony involvement. 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    5/1/24: He is here today for his post-op visit. He is having some swelling he states in the inside of him mouth as the surgery site. He is on a full liquid diet. He is using the mouth wash morning and night and as well as anytime he eats or drinks. He is using tylenol 1-2x a day for pain control. He is staying hydrated.     5/9/24: He was having some swelling and pain on Monday but he states it is better today. He is still doing his mouth rinse. He is still on full liquids.    5/23/24: He is doing well. He states he has no concerns but he does have the one area on the left side that we are keeping an eye on it. He states it feels rough which is how it has been. He has a kidney stone and needs a stent and will perform a laser on the left kidney. He wants to make sure it is okay for him to be intubated. He has been having the stone since November, it is a non-obstructing stone. He is wanting to restart nexletol. He is on soft foods and doing good. He is also asking about if he can get implant or bridge in the future.     8/22/24: He noticed on July 15th he started with a bump that was painless at first on his right inside of his lip. He states 2 Saturdays ago it started with pain and he was able to get out clear thick fluid. He states since then it has almost resolved. He never had any further drainage since then. He denies any pain. He denies any other  concerns. He still has numbness to his right lower lip since surgery that may be getting better.     11/25/24: His wisdom tooth on the right lower started moving in September and he had his tooth pulled. His dentist did speak with Dr. Dodson and both agreed best option was to pull the tooth. He was having some issues with healing right after but has completely healed since. His dentist wants to remove the root tips on two of his lower right teeth due to it not healing. He has not yet scheduled it. He did have a bump again on the inside of his lip that came back again after our last visit, its been 2 months but has not come back since. His feeling on his lip is starting to come back. He denies any pain.     2/24/25: He says that the spot has grown in the last month on the left bottom behind his teeth. He denies any pain in the area. He is also wanting to schedule a colonoscopy since he has never had one. He has a visit with his PCP tomorrow. He has no other concerns at this time.     4/9/25: He is here today for his post-op appointment. He started with sorenes in the left side by his teeth. He thinks it may be due to going back to a regular diet.     5/26/25: Patient presents today for three month follow-up after oral surgery. He reports feeling great with no post-surgical pain. He resumed normal diet two weeks post-surgery, though experienced mild burning sensation with spicy foods at that time. Previous root tip concern has not been problematic and he prefers to avoid additional surgical intervention.    TREATMENT HISTORY:  4/25/24: Wide local excision with laser, 4 teeth pulled, marginal mandibulectomy    4/2/25: ABLATION, USING LASER - Oral Cavity - Lingual gingiva      SUBSTANCE USE:  Smoking: Hx of smokeless tobacco - quit 2 years ago  Denies cigarettes, hookah, marijuana, betel nut, illicit drug, heavy cigar, vape use.    ALLERGIES:  Review of patient's allergies indicates:   Allergen Reactions    Felodipine  Swelling    Blueberry Diarrhea    Zocor [simvastatin]      Bone and joint pain         MEDICATIONS:    Current Outpatient Medications:     acebutoloL (SECTRAL) 200 MG capsule, Take 200 mg by mouth 2 (two) times daily., Disp: , Rfl:     allopurinoL (ZYLOPRIM) 100 MG tablet, Take 100 mg by mouth 3 (three) times daily., Disp: , Rfl:     aspirin (ECOTRIN) 81 MG EC tablet, Take 81 mg by mouth once daily., Disp: , Rfl:     bempedoic acid (NEXLETOL) 180 mg Tab, once daily., Disp: , Rfl:     hydroCHLOROthiazide (HYDRODIURIL) 25 MG tablet, Take 25 mg by mouth once daily., Disp: , Rfl:     levothyroxine (SYNTHROID) 75 MCG tablet, Take 1 tablet (75 mcg total) by mouth once daily., Disp: 90 tablet, Rfl: 1    magnesium oxide (MAG-OX) 200 MG tablet, every evening., Disp: , Rfl:     tadalafiL (CIALIS) 20 MG Tab, Take 1 tablet by mouth daily as needed., Disp: , Rfl:     vitamin D (VITAMIN D3) 1000 units Tab, Take 1,000 Units by mouth once daily., Disp: , Rfl:     vitamin K2 100 mcg Cap, Take by mouth., Disp: , Rfl:     NIFEdipine (ADALAT CC) 30 MG TbSR, Take 1 tablet (30 mg total) by mouth once daily. (Patient taking differently: Take 30 mg by mouth every evening.), Disp: 90 tablet, Rfl: 3    olmesartan (BENICAR) 40 MG tablet, Take 1 tablet (40 mg total) by mouth once daily. (Patient taking differently: Take 40 mg by mouth every morning.), Disp: 90 tablet, Rfl: 3    oxyCODONE (ROXICODONE) 5 MG immediate release tablet, Take 1 tablet (5 mg total) by mouth every 4 (four) hours as needed for Pain. (Patient not taking: Reported on 4/9/2025), Disp: 10 tablet, Rfl: 0    tadalafiL (CIALIS) 5 MG tablet, Take 1 tablet every day by oral route for 30 days., Disp: , Rfl:     terazosin (HYTRIN) 5 MG capsule, Take 1 capsule (5 mg total) by mouth every evening. (Patient taking differently: Take 5 mg by mouth once daily.), Disp: 30 capsule, Rfl: 11    OTHER HISTORY  Past medical, surgical, family, and social histories have been updated and  "reviewed as needed since last visit.     See above substance history    REVIEW OF SYSTEMS:   Comprehensive review of systems was discussed with the patient.  It is positive only for the above complaints.    PHYSICAL EXAMINATION:  Blood pressure 138/70, pulse (!) 50, temperature 98.5 °F (36.9 °C), temperature source Temporal, resp. rate 16, height 5' 10" (1.778 m), weight 86 kg (189 lb 9.5 oz), SpO2 97%.    Constitutional: Non-toxic appearing.   Psychiatric: Appropriate mood and affect. Cooperative.  Voice: Non-dysphonic, speaking in full sentences.   Neurologic: Cranial nerves grossly intact, no focal deficits.  Head and face: Salivary glands are not enlarged. Face is symmetric. CN VII strength intact.  Skin: No concerning skin lesions.   Eyes: Vision grossly intact, bilateral extraocular movements intact  Ears: Bilateral pinna, mastoid, external ear canal normal. Hearing intact.   Nose: External nose appears normal.   Lips: No ulcers or lesions  Oral cavity:  Mucosa is pink and moist. Buccal mucosa, gingiva, anterior tongue, floor of mouth, and hard palate appear normal. Area on Left lingual gum healed well as area on left mandibular labial gingiva  Oropharynx: Mucosa is pink and moist. Soft palate and base of tongue are normal. Posterior pharyngeal wall normal. Tonsils are normal. No lesions.  Neck: Soft and flat,  no masses or lymphadenopathy. No palpable thyroid enlargement or nodules.  Respiratory: Chest expansion symmetric, no audible stridor or stertor. Breathing is unlabored. No active cough.    CLINICAL PHOTOS:  4/9/25:               2/24/25 11/25/25:                Post-op visit 5/1/24:      Area of leukoplakia that we will be monitoring    From Dr. Bolton's visit:      Timeout Pause    Row Name 11:20       Timeout Pause   Person Completing Patient Verification Patient       Stated 2 Patient Identifiers Y       Correct Procedure? Y       Correct Site? Y          BIOPSY OF left lingual " gingiva  Provider: Anneliese Dodson MD  Indication: mass on lingual gingiva  After verbal and written consent was obtained, the area was anesthetized with 1% lidocaine and 1:100,000 epinephrine solution.    Thereafter, a cupped forceps was used to harvest a representative specimen of mucosa, which was sent in formalin for routine evaluation.    Hemostasis was achieved with silver nitrate and pressure.    The patient tolerated the procedure well with no complications.     DATA REVIEWED:   LABORATORY:      Latest Ref Rng & Units 4/17/2024    10:43 AM 7/8/2024     7:27 AM 3/24/2025    10:41 AM   Thyroid Labs   Sodium 136 - 145 mmol/L 137  139  140    Potassium 3.5 - 5.1 mmol/L 3.9  3.8  4.0    Chloride 95 - 110 mmol/L 103  107  106    Carbon Dioxide 23 - 29 mmol/L 26  22  26    Glucose 70 - 110 mg/dL 107  116  103    Blood Urea Nitrogen 8 - 23 mg/dL 25  44  32    Creatinine 0.50 - 1.40 mg/dL 1.29  1.60  1.50    Calcium 8.7 - 10.5 mg/dL 10.3  10.0  9.6    Anion Gap 8 - 16 mmol/L 8  10  8    WBC 3.90 - 12.70 K/uL 5.11  5.86  5.26    RBC 4.60 - 6.20 M/uL 4.78  4.62  4.09    Hemoglobin 14.0 - 18.0 g/dL 13.8  13.6  12.6    Hematocrit 40.0 - 54.0 % 41.2  40.5  36.8    MCV 82 - 98 fL 86  88  90    MCH 27.0 - 31.0 pg 28.9  29.4  30.8    MCHC 32.0 - 36.0 g/dL 33.5  33.6  34.2    RDW 11.5 - 14.5 % 14.4  14.6  13.8    Platelets 150 - 450 K/uL 206  200  170    MPV 9.2 - 12.9 fL 11.8  11.5  11.6    INR  1.0  1.0  0.9    APTT 24.6 - 36.7 sec 29.6  29.0  27.8         PATHOLOGY:  DIAGNOSIS:     04/04/2025 JWH/kl,tml     LEFT LABIAL GINGIVA:     --SQUAMOUS MUCOSA WITH CHRONIC INFLAMMATION, HYPERKERATOSIS,      PARAKERATOSIS, AND FOCAL EPITHELIAL ATROPHY.     --PAS STAIN IS NEGATIVE FOR FUNGAL ORGANISMS.     --NO DEFINITIVE DYSPLASIA IS IDENTIFIED, ALTHOUGH IT SHOULD BE      NOTED THAT EXTENSIVE CAUTERY ARTIFACT CONSIDERABLY HINDERS       DIAGNOSIS:   04/26/2024 JAR   1. ORAL CAVITY, ANTERIOR TONGUE LESION, EXCISION   - FIBROMA   2. DENTAL  MARGIN   - SCANT FRAGMENTS OF ATYPICAL SQUAMOUS MUCOSA   3. RIGHT ORAL CAVITY LESION, WIDE LOCAL EXCISION   - INVASIVE VERRUCOUS CARCINOMA   - TUMOR APPROACHES DEEP MARGIN OF THE MEDIAL TISSUE EDGE   - TUMOR AT LEAST 5 MM TO ALL REMAINING MARGINS 4. RIGHT MANDIBULAR    TEETH, EXCISION   - TEETH IDENTIFIED GROSSLY   - ADDITIONAL FRAGMENTS OF SQUAMOUS MUCOSA PRESENT   - NEGATIVE FOR TUMOR 5. TOOTH FRAGMENT, EXCISION   - TOOTH IDENTIFIED GROSSLY 6. INTERDENTAL MUCOSA, EXCISION   - SQUAMOUS MUCOSA   - NEGATIVE FOR TUMOR   - NEGATIVE FOR DYSPLASIA     ORAL CAVITY:   PROCEDURE: Wide local excision   TUMOR FOCALITY: Unifocal   TUMOR SITE: Right mandibular gingiva   TUMOR LATERALITY: Right   TUMOR SIZE: 24 mm   TUMOR DEPTH OF INVASION (MM): 2 mm   HISTOLOGIC TYPE: Invasive verrucous carcinoma   HISTOLOGIC GRADE: Well differentiated   MARGINS: Negative   DISTANCE FROM CLOSEST MARGIN (MM): Tumor approaches deep margin of the    medial tissue edge (see parts 4 and 5) Tumor at least 5 mm to all    remainder margins   SPECIFY MARGIN: N/A   PERINEURAL INVASION: Negative   LYMPHOVASCULAR INVASION: Negative   REGIONAL LYMPH NODES: N/A   NUMBER OF LYMPH NODES WITH TUMOR: N/A   LATERALITY OF NODES WITH TUMOR: N/A   SIZE OF LARGEST SHALA METASTATIC DEPOSIT: N/A   EXTRANODAL EXTENSION: N/A   NUMBER OF LYMPH NODES EXAMINED: 0   DISTANT METASTASIS: Not known   TNM CODE: pT2 pN not assigned (no nodes submitted / found)   Tumor at least 5 mm to all remainder margins   _______________________________________________________________________________________________________       3/14/2024:     Abnormal   ORAL CAVITY MASS, BIOPSY:  Atypical verrucous proliferation.  See comment.    Comment:  This is a bulky hyperkeratotic and warty squamous proliferation with dense lichenoid chronic inflammation.  There is a prominent endophytic component with expansive bulbous rete ridges concerning for pushing invasion.  The lesion is  predominantly  cytologically bland with only mild atypia confined to the basal layer.  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  However, definitive diagnosis in this particular  setting is difficult without examination of a full excisional specimen. Dr Phil Biswas also reviewed this case and agrees with these findings.    P16 immunostaining is pending.  The results will be issued in a supplemental report.             IMAGING:  CT SOFT TISSUE NECK WITH CONTRAST 4/3/24  Impression:     1. No evidence of metastatic disease within the neck or any aggressive locoregional soft tissue or bony involvement.  This is with special attention to the right mandibular region.  The patient's known lesion is not clearly/well-defined by CT as described above.    ASSESSMENT AND PLAN:  1. Acquired dysplasia of oral cavity    2. Cancer of oral cavity    3. Mass of oral cavity    4. Past history of chewing tobacco use        Ashwin Thompson is a 70 y.o. male with right oral leukoplakia. S/P recent laser 3 months ago with no new areas on today's exam.     PERSONAL HISTORY OF OTHER SPECIFIED CONDITIONS:  - Assessed healing progress 3 months post-procedure.  - Examined oral cavity and neck, noting complete healing with no new areas of concern.    OTHER SPECIFIED DISORDERS OF TEETH AND SUPPORTING STRUCTURES:  - Root tip remains asymptomatic, no intervention needed, considering Dr. Dodson's previous assessment regarding unnecessary additional surgery for root tip removal.          Follow-up in 3 months or sooner with any concerns    25 minutes spent in direct patient care, chart review, documentation and review with collaborative MD    This note was generated with the assistance of ambient listening technology. Verbal consent was obtained by the patient and accompanying visitor(s) for the recording of patient appointment to facilitate this note. I attest to having reviewed and  edited the generated note for accuracy, though some syntax or spelling errors may persist. Please contact the author of this note for any clarification.

## 2025-08-27 ENCOUNTER — OFFICE VISIT (OUTPATIENT)
Dept: HEMATOLOGY/ONCOLOGY | Facility: CLINIC | Age: 70
End: 2025-08-27
Payer: MEDICARE

## 2025-08-27 VITALS
TEMPERATURE: 98 F | SYSTOLIC BLOOD PRESSURE: 128 MMHG | HEIGHT: 70 IN | WEIGHT: 189.63 LBS | RESPIRATION RATE: 18 BRPM | OXYGEN SATURATION: 98 % | BODY MASS INDEX: 27.15 KG/M2 | HEART RATE: 84 BPM | DIASTOLIC BLOOD PRESSURE: 68 MMHG

## 2025-08-27 DIAGNOSIS — Z87.891 PAST HISTORY OF CHEWING TOBACCO USE: ICD-10-CM

## 2025-08-27 DIAGNOSIS — K13.79 ACQUIRED DYSPLASIA OF ORAL CAVITY: Primary | ICD-10-CM

## 2025-08-27 DIAGNOSIS — C06.9 CANCER OF ORAL CAVITY: ICD-10-CM

## 2025-08-27 PROCEDURE — 99999 PR PBB SHADOW E&M-EST. PATIENT-LVL IV: CPT | Mod: PBBFAC,,, | Performed by: NURSE PRACTITIONER

## 2025-08-27 PROCEDURE — 99213 OFFICE O/P EST LOW 20 MIN: CPT | Mod: S$PBB,,, | Performed by: NURSE PRACTITIONER

## 2025-08-27 PROCEDURE — 99214 OFFICE O/P EST MOD 30 MIN: CPT | Mod: PBBFAC,PN | Performed by: NURSE PRACTITIONER
